# Patient Record
Sex: MALE | Race: WHITE | NOT HISPANIC OR LATINO | Employment: UNEMPLOYED | URBAN - METROPOLITAN AREA
[De-identification: names, ages, dates, MRNs, and addresses within clinical notes are randomized per-mention and may not be internally consistent; named-entity substitution may affect disease eponyms.]

---

## 2022-05-16 ENCOUNTER — HOSPITAL ENCOUNTER (INPATIENT)
Facility: HOSPITAL | Age: 32
LOS: 1 days | Discharge: LEFT BEFORE DISCHARGE | End: 2022-05-18
Attending: EMERGENCY MEDICINE | Admitting: INTERNAL MEDICINE

## 2022-05-16 DIAGNOSIS — F10.939 ALCOHOL WITHDRAWAL SYNDROME WITH COMPLICATION: Primary | ICD-10-CM

## 2022-05-16 LAB
ALBUMIN SERPL-MCNC: 4 G/DL (ref 3.5–5.2)
ALBUMIN/GLOB SERPL: 1.8 G/DL
ALP SERPL-CCNC: 78 U/L (ref 39–117)
ALT SERPL W P-5'-P-CCNC: 29 U/L (ref 1–41)
ANION GAP SERPL CALCULATED.3IONS-SCNC: 13 MMOL/L (ref 5–15)
AST SERPL-CCNC: 21 U/L (ref 1–40)
BASOPHILS # BLD AUTO: 0.08 10*3/MM3 (ref 0–0.2)
BASOPHILS NFR BLD AUTO: 1.6 % (ref 0–1.5)
BILIRUB SERPL-MCNC: 0.2 MG/DL (ref 0–1.2)
BUN SERPL-MCNC: 4 MG/DL (ref 6–20)
BUN/CREAT SERPL: 6.3 (ref 7–25)
CALCIUM SPEC-SCNC: 7.9 MG/DL (ref 8.6–10.5)
CHLORIDE SERPL-SCNC: 106 MMOL/L (ref 98–107)
CO2 SERPL-SCNC: 22 MMOL/L (ref 22–29)
CREAT SERPL-MCNC: 0.63 MG/DL (ref 0.76–1.27)
DEPRECATED RDW RBC AUTO: 47.9 FL (ref 37–54)
EGFRCR SERPLBLD CKD-EPI 2021: 129.6 ML/MIN/1.73
EOSINOPHIL # BLD AUTO: 0.18 10*3/MM3 (ref 0–0.4)
EOSINOPHIL NFR BLD AUTO: 3.7 % (ref 0.3–6.2)
ERYTHROCYTE [DISTWIDTH] IN BLOOD BY AUTOMATED COUNT: 13.8 % (ref 12.3–15.4)
ETHANOL BLD-MCNC: 133 MG/DL (ref 0–10)
ETHANOL UR QL: 0.13 %
GLOBULIN UR ELPH-MCNC: 2.2 GM/DL
GLUCOSE SERPL-MCNC: 91 MG/DL (ref 65–99)
HCT VFR BLD AUTO: 43.2 % (ref 37.5–51)
HGB BLD-MCNC: 14.7 G/DL (ref 13–17.7)
IMM GRANULOCYTES # BLD AUTO: 0.02 10*3/MM3 (ref 0–0.05)
IMM GRANULOCYTES NFR BLD AUTO: 0.4 % (ref 0–0.5)
INR PPP: 1.04 (ref 0.9–1.1)
LYMPHOCYTES # BLD AUTO: 1.96 10*3/MM3 (ref 0.7–3.1)
LYMPHOCYTES NFR BLD AUTO: 40.3 % (ref 19.6–45.3)
MAGNESIUM SERPL-MCNC: 1.9 MG/DL (ref 1.6–2.6)
MCH RBC QN AUTO: 32.3 PG (ref 26.6–33)
MCHC RBC AUTO-ENTMCNC: 34 G/DL (ref 31.5–35.7)
MCV RBC AUTO: 94.9 FL (ref 79–97)
MONOCYTES # BLD AUTO: 0.28 10*3/MM3 (ref 0.1–0.9)
MONOCYTES NFR BLD AUTO: 5.8 % (ref 5–12)
NEUTROPHILS NFR BLD AUTO: 2.34 10*3/MM3 (ref 1.7–7)
NEUTROPHILS NFR BLD AUTO: 48.2 % (ref 42.7–76)
NRBC BLD AUTO-RTO: 0 /100 WBC (ref 0–0.2)
PLATELET # BLD AUTO: 201 10*3/MM3 (ref 140–450)
PMV BLD AUTO: 10.1 FL (ref 6–12)
POTASSIUM SERPL-SCNC: 3.5 MMOL/L (ref 3.5–5.2)
PROT SERPL-MCNC: 6.2 G/DL (ref 6–8.5)
PROTHROMBIN TIME: 13.5 SECONDS (ref 11.7–14.2)
RBC # BLD AUTO: 4.55 10*6/MM3 (ref 4.14–5.8)
SARS-COV-2 RNA RESP QL NAA+PROBE: NOT DETECTED
SODIUM SERPL-SCNC: 141 MMOL/L (ref 136–145)
WBC NRBC COR # BLD: 4.86 10*3/MM3 (ref 3.4–10.8)

## 2022-05-16 PROCEDURE — 85025 COMPLETE CBC W/AUTO DIFF WBC: CPT | Performed by: PHYSICIAN ASSISTANT

## 2022-05-16 PROCEDURE — 99284 EMERGENCY DEPT VISIT MOD MDM: CPT

## 2022-05-16 PROCEDURE — U0003 INFECTIOUS AGENT DETECTION BY NUCLEIC ACID (DNA OR RNA); SEVERE ACUTE RESPIRATORY SYNDROME CORONAVIRUS 2 (SARS-COV-2) (CORONAVIRUS DISEASE [COVID-19]), AMPLIFIED PROBE TECHNIQUE, MAKING USE OF HIGH THROUGHPUT TECHNOLOGIES AS DESCRIBED BY CMS-2020-01-R: HCPCS | Performed by: PHYSICIAN ASSISTANT

## 2022-05-16 PROCEDURE — 80053 COMPREHEN METABOLIC PANEL: CPT | Performed by: PHYSICIAN ASSISTANT

## 2022-05-16 PROCEDURE — 25010000002 ONDANSETRON PER 1 MG: Performed by: PHYSICIAN ASSISTANT

## 2022-05-16 PROCEDURE — 82077 ASSAY SPEC XCP UR&BREATH IA: CPT | Performed by: PHYSICIAN ASSISTANT

## 2022-05-16 PROCEDURE — 85610 PROTHROMBIN TIME: CPT | Performed by: PHYSICIAN ASSISTANT

## 2022-05-16 PROCEDURE — 25010000002 THIAMINE PER 100 MG: Performed by: PHYSICIAN ASSISTANT

## 2022-05-16 PROCEDURE — 36415 COLL VENOUS BLD VENIPUNCTURE: CPT

## 2022-05-16 PROCEDURE — 83735 ASSAY OF MAGNESIUM: CPT | Performed by: PHYSICIAN ASSISTANT

## 2022-05-16 PROCEDURE — 25010000002 LORAZEPAM PER 2 MG: Performed by: EMERGENCY MEDICINE

## 2022-05-16 RX ORDER — ONDANSETRON 2 MG/ML
4 INJECTION INTRAMUSCULAR; INTRAVENOUS ONCE
Status: DISCONTINUED | OUTPATIENT
Start: 2022-05-16 | End: 2022-05-16

## 2022-05-16 RX ORDER — LORAZEPAM 2 MG/ML
2 INJECTION INTRAMUSCULAR ONCE
Status: COMPLETED | OUTPATIENT
Start: 2022-05-16 | End: 2022-05-16

## 2022-05-16 RX ORDER — ONDANSETRON 2 MG/ML
4 INJECTION INTRAMUSCULAR; INTRAVENOUS ONCE
Status: COMPLETED | OUTPATIENT
Start: 2022-05-16 | End: 2022-05-16

## 2022-05-16 RX ORDER — ALUMINA, MAGNESIA, AND SIMETHICONE 2400; 2400; 240 MG/30ML; MG/30ML; MG/30ML
15 SUSPENSION ORAL ONCE
Status: COMPLETED | OUTPATIENT
Start: 2022-05-16 | End: 2022-05-16

## 2022-05-16 RX ORDER — SODIUM CHLORIDE 0.9 % (FLUSH) 0.9 %
10 SYRINGE (ML) INJECTION AS NEEDED
Status: DISCONTINUED | OUTPATIENT
Start: 2022-05-16 | End: 2022-05-18 | Stop reason: HOSPADM

## 2022-05-16 RX ORDER — LIDOCAINE HYDROCHLORIDE 20 MG/ML
15 SOLUTION OROPHARYNGEAL ONCE
Status: COMPLETED | OUTPATIENT
Start: 2022-05-16 | End: 2022-05-16

## 2022-05-16 RX ADMIN — ONDANSETRON 4 MG: 2 INJECTION INTRAMUSCULAR; INTRAVENOUS at 22:48

## 2022-05-16 RX ADMIN — THIAMINE HYDROCHLORIDE 1000 ML/HR: 100 INJECTION, SOLUTION INTRAMUSCULAR; INTRAVENOUS at 22:38

## 2022-05-16 RX ADMIN — LORAZEPAM 2 MG: 2 INJECTION INTRAMUSCULAR; INTRAVENOUS at 22:44

## 2022-05-16 RX ADMIN — LIDOCAINE HYDROCHLORIDE 15 ML: 20 SOLUTION ORAL; TOPICAL at 22:46

## 2022-05-16 RX ADMIN — ALUMINUM HYDROXIDE, MAGNESIUM HYDROXIDE, AND DIMETHICONE 15 ML: 400; 400; 40 SUSPENSION ORAL at 22:47

## 2022-05-17 ENCOUNTER — APPOINTMENT (OUTPATIENT)
Dept: GENERAL RADIOLOGY | Facility: HOSPITAL | Age: 32
End: 2022-05-17

## 2022-05-17 PROBLEM — R56.9 SEIZURE DUE TO ALCOHOL WITHDRAWAL: Status: ACTIVE | Noted: 2022-05-17

## 2022-05-17 PROBLEM — F10.939 SEIZURE DUE TO ALCOHOL WITHDRAWAL: Status: ACTIVE | Noted: 2022-05-17

## 2022-05-17 PROBLEM — F17.210 TOBACCO DEPENDENCE DUE TO CIGARETTES: Status: ACTIVE | Noted: 2022-05-17

## 2022-05-17 PROBLEM — F10.939 ALCOHOL WITHDRAWAL SYNDROME WITH COMPLICATION: Status: ACTIVE | Noted: 2022-05-17

## 2022-05-17 PROBLEM — E83.51 HYPOCALCEMIA: Status: ACTIVE | Noted: 2022-05-17

## 2022-05-17 LAB
25(OH)D3 SERPL-MCNC: 19.7 NG/ML (ref 30–100)
AMPHET+METHAMPHET UR QL: NEGATIVE
ANION GAP SERPL CALCULATED.3IONS-SCNC: 11.1 MMOL/L (ref 5–15)
BARBITURATES UR QL SCN: POSITIVE
BENZODIAZ UR QL SCN: POSITIVE
BILIRUB UR QL STRIP: NEGATIVE
BUN SERPL-MCNC: 4 MG/DL (ref 6–20)
BUN/CREAT SERPL: 6.5 (ref 7–25)
CALCIUM SPEC-SCNC: 7.8 MG/DL (ref 8.6–10.5)
CANNABINOIDS SERPL QL: NEGATIVE
CHLORIDE SERPL-SCNC: 103 MMOL/L (ref 98–107)
CLARITY UR: CLEAR
CO2 SERPL-SCNC: 21.9 MMOL/L (ref 22–29)
COCAINE UR QL: NEGATIVE
COLOR UR: YELLOW
CREAT SERPL-MCNC: 0.62 MG/DL (ref 0.76–1.27)
DEPRECATED RDW RBC AUTO: 45.3 FL (ref 37–54)
EGFRCR SERPLBLD CKD-EPI 2021: 130.2 ML/MIN/1.73
ERYTHROCYTE [DISTWIDTH] IN BLOOD BY AUTOMATED COUNT: 13.5 % (ref 12.3–15.4)
GLUCOSE SERPL-MCNC: 92 MG/DL (ref 65–99)
GLUCOSE UR STRIP-MCNC: NEGATIVE MG/DL
HCT VFR BLD AUTO: 39.2 % (ref 37.5–51)
HGB BLD-MCNC: 13.1 G/DL (ref 13–17.7)
HGB UR QL STRIP.AUTO: NEGATIVE
KETONES UR QL STRIP: NEGATIVE
LEUKOCYTE ESTERASE UR QL STRIP.AUTO: NEGATIVE
LIPASE SERPL-CCNC: 7 U/L (ref 13–60)
MAGNESIUM SERPL-MCNC: 1.7 MG/DL (ref 1.6–2.6)
MCH RBC QN AUTO: 31.3 PG (ref 26.6–33)
MCHC RBC AUTO-ENTMCNC: 33.4 G/DL (ref 31.5–35.7)
MCV RBC AUTO: 93.6 FL (ref 79–97)
METHADONE UR QL SCN: NEGATIVE
NITRITE UR QL STRIP: NEGATIVE
OPIATES UR QL: NEGATIVE
OXYCODONE UR QL SCN: NEGATIVE
PH UR STRIP.AUTO: 5.5 [PH] (ref 5–8)
PLATELET # BLD AUTO: 167 10*3/MM3 (ref 140–450)
PMV BLD AUTO: 10.4 FL (ref 6–12)
POTASSIUM SERPL-SCNC: 3.5 MMOL/L (ref 3.5–5.2)
PROT UR QL STRIP: NEGATIVE
RBC # BLD AUTO: 4.19 10*6/MM3 (ref 4.14–5.8)
SODIUM SERPL-SCNC: 136 MMOL/L (ref 136–145)
SP GR UR STRIP: 1.01 (ref 1–1.03)
UROBILINOGEN UR QL STRIP: NORMAL
WBC NRBC COR # BLD: 6.93 10*3/MM3 (ref 3.4–10.8)

## 2022-05-17 PROCEDURE — 25010000002 ONDANSETRON PER 1 MG: Performed by: NURSE PRACTITIONER

## 2022-05-17 PROCEDURE — 81003 URINALYSIS AUTO W/O SCOPE: CPT | Performed by: PHYSICIAN ASSISTANT

## 2022-05-17 PROCEDURE — 80307 DRUG TEST PRSMV CHEM ANLYZR: CPT | Performed by: PHYSICIAN ASSISTANT

## 2022-05-17 PROCEDURE — 83690 ASSAY OF LIPASE: CPT | Performed by: PHYSICIAN ASSISTANT

## 2022-05-17 PROCEDURE — 63710000001 ONDANSETRON PER 8 MG: Performed by: NURSE PRACTITIONER

## 2022-05-17 PROCEDURE — 25010000002 LORAZEPAM PER 2 MG: Performed by: EMERGENCY MEDICINE

## 2022-05-17 PROCEDURE — 71045 X-RAY EXAM CHEST 1 VIEW: CPT

## 2022-05-17 PROCEDURE — 83735 ASSAY OF MAGNESIUM: CPT | Performed by: NURSE PRACTITIONER

## 2022-05-17 PROCEDURE — 85027 COMPLETE CBC AUTOMATED: CPT | Performed by: NURSE PRACTITIONER

## 2022-05-17 PROCEDURE — 25010000002 LORAZEPAM PER 2 MG: Performed by: NURSE PRACTITIONER

## 2022-05-17 PROCEDURE — 25010000002 ENOXAPARIN PER 10 MG: Performed by: NURSE PRACTITIONER

## 2022-05-17 PROCEDURE — 80048 BASIC METABOLIC PNL TOTAL CA: CPT | Performed by: NURSE PRACTITIONER

## 2022-05-17 PROCEDURE — 82306 VITAMIN D 25 HYDROXY: CPT | Performed by: NURSE PRACTITIONER

## 2022-05-17 RX ORDER — SODIUM CHLORIDE 0.9 % (FLUSH) 0.9 %
10 SYRINGE (ML) INJECTION EVERY 12 HOURS SCHEDULED
Status: DISCONTINUED | OUTPATIENT
Start: 2022-05-17 | End: 2022-05-18 | Stop reason: HOSPADM

## 2022-05-17 RX ORDER — LORAZEPAM 2 MG/ML
2 INJECTION INTRAMUSCULAR
Status: DISCONTINUED | OUTPATIENT
Start: 2022-05-17 | End: 2022-05-18 | Stop reason: HOSPADM

## 2022-05-17 RX ORDER — FAMOTIDINE 10 MG/ML
20 INJECTION, SOLUTION INTRAVENOUS EVERY 12 HOURS SCHEDULED
Status: DISCONTINUED | OUTPATIENT
Start: 2022-05-17 | End: 2022-05-18

## 2022-05-17 RX ORDER — NITROGLYCERIN 0.4 MG/1
0.4 TABLET SUBLINGUAL
Status: DISCONTINUED | OUTPATIENT
Start: 2022-05-17 | End: 2022-05-18 | Stop reason: HOSPADM

## 2022-05-17 RX ORDER — LORAZEPAM 2 MG/ML
1 INJECTION INTRAMUSCULAR ONCE
Status: DISCONTINUED | OUTPATIENT
Start: 2022-05-17 | End: 2022-05-17

## 2022-05-17 RX ORDER — LORAZEPAM 2 MG/ML
2 INJECTION INTRAMUSCULAR ONCE
Status: COMPLETED | OUTPATIENT
Start: 2022-05-17 | End: 2022-05-17

## 2022-05-17 RX ORDER — SODIUM CHLORIDE 0.9 % (FLUSH) 0.9 %
10 SYRINGE (ML) INJECTION AS NEEDED
Status: DISCONTINUED | OUTPATIENT
Start: 2022-05-17 | End: 2022-05-18 | Stop reason: HOSPADM

## 2022-05-17 RX ORDER — LORAZEPAM 2 MG/ML
1 INJECTION INTRAMUSCULAR
Status: DISCONTINUED | OUTPATIENT
Start: 2022-05-17 | End: 2022-05-18 | Stop reason: HOSPADM

## 2022-05-17 RX ORDER — ENOXAPARIN SODIUM 100 MG/ML
40 INJECTION SUBCUTANEOUS NIGHTLY
Status: DISCONTINUED | OUTPATIENT
Start: 2022-05-17 | End: 2022-05-18 | Stop reason: HOSPADM

## 2022-05-17 RX ORDER — SERTRALINE HYDROCHLORIDE 100 MG/1
200 TABLET, FILM COATED ORAL DAILY
COMMUNITY

## 2022-05-17 RX ORDER — ONDANSETRON 2 MG/ML
4 INJECTION INTRAMUSCULAR; INTRAVENOUS EVERY 6 HOURS PRN
Status: DISCONTINUED | OUTPATIENT
Start: 2022-05-17 | End: 2022-05-18 | Stop reason: HOSPADM

## 2022-05-17 RX ORDER — DIPHENOXYLATE HYDROCHLORIDE AND ATROPINE SULFATE 2.5; .025 MG/1; MG/1
1 TABLET ORAL DAILY
Status: DISCONTINUED | OUTPATIENT
Start: 2022-05-18 | End: 2022-05-18 | Stop reason: HOSPADM

## 2022-05-17 RX ORDER — ACETAMINOPHEN 160 MG/5ML
650 SOLUTION ORAL EVERY 4 HOURS PRN
Status: DISCONTINUED | OUTPATIENT
Start: 2022-05-17 | End: 2022-05-18 | Stop reason: HOSPADM

## 2022-05-17 RX ORDER — ACETAMINOPHEN 650 MG/1
650 SUPPOSITORY RECTAL EVERY 4 HOURS PRN
Status: DISCONTINUED | OUTPATIENT
Start: 2022-05-17 | End: 2022-05-18 | Stop reason: HOSPADM

## 2022-05-17 RX ORDER — CALCIUM CARBONATE 200(500)MG
2 TABLET,CHEWABLE ORAL 2 TIMES DAILY PRN
Status: DISCONTINUED | OUTPATIENT
Start: 2022-05-17 | End: 2022-05-18 | Stop reason: HOSPADM

## 2022-05-17 RX ORDER — ACETAMINOPHEN 325 MG/1
650 TABLET ORAL EVERY 4 HOURS PRN
Status: DISCONTINUED | OUTPATIENT
Start: 2022-05-17 | End: 2022-05-18 | Stop reason: HOSPADM

## 2022-05-17 RX ORDER — LORAZEPAM 1 MG/1
1 TABLET ORAL
Status: DISCONTINUED | OUTPATIENT
Start: 2022-05-17 | End: 2022-05-18 | Stop reason: HOSPADM

## 2022-05-17 RX ORDER — ONDANSETRON 4 MG/1
4 TABLET, FILM COATED ORAL EVERY 6 HOURS PRN
Status: DISCONTINUED | OUTPATIENT
Start: 2022-05-17 | End: 2022-05-18 | Stop reason: HOSPADM

## 2022-05-17 RX ORDER — OLANZAPINE 10 MG/1
20 TABLET ORAL NIGHTLY
COMMUNITY

## 2022-05-17 RX ORDER — LORAZEPAM 1 MG/1
2 TABLET ORAL
Status: DISCONTINUED | OUTPATIENT
Start: 2022-05-17 | End: 2022-05-18 | Stop reason: HOSPADM

## 2022-05-17 RX ORDER — CHLORDIAZEPOXIDE HYDROCHLORIDE 25 MG/1
25 CAPSULE, GELATIN COATED ORAL EVERY 8 HOURS SCHEDULED
Status: DISCONTINUED | OUTPATIENT
Start: 2022-05-17 | End: 2022-05-18 | Stop reason: HOSPADM

## 2022-05-17 RX ORDER — SODIUM CHLORIDE 9 MG/ML
75 INJECTION, SOLUTION INTRAVENOUS CONTINUOUS
Status: DISCONTINUED | OUTPATIENT
Start: 2022-05-17 | End: 2022-05-18 | Stop reason: HOSPADM

## 2022-05-17 RX ORDER — LORAZEPAM 1 MG/1
1 TABLET ORAL EVERY 6 HOURS
COMMUNITY

## 2022-05-17 RX ORDER — FOLIC ACID 1 MG/1
1 TABLET ORAL DAILY
Status: DISCONTINUED | OUTPATIENT
Start: 2022-05-18 | End: 2022-05-18 | Stop reason: HOSPADM

## 2022-05-17 RX ADMIN — ENOXAPARIN SODIUM 40 MG: 100 INJECTION SUBCUTANEOUS at 21:05

## 2022-05-17 RX ADMIN — LORAZEPAM 2 MG: 2 INJECTION INTRAMUSCULAR; INTRAVENOUS at 17:14

## 2022-05-17 RX ADMIN — SODIUM CHLORIDE 100 ML/HR: 9 INJECTION, SOLUTION INTRAVENOUS at 04:16

## 2022-05-17 RX ADMIN — LORAZEPAM 2 MG: 2 INJECTION INTRAMUSCULAR; INTRAVENOUS at 11:22

## 2022-05-17 RX ADMIN — Medication 10 ML: at 08:00

## 2022-05-17 RX ADMIN — LORAZEPAM 1 MG: 2 INJECTION INTRAMUSCULAR; INTRAVENOUS at 08:15

## 2022-05-17 RX ADMIN — ONDANSETRON HYDROCHLORIDE 4 MG: 4 TABLET, FILM COATED ORAL at 21:05

## 2022-05-17 RX ADMIN — CHLORDIAZEPOXIDE HYDROCHLORIDE 25 MG: 25 CAPSULE ORAL at 14:40

## 2022-05-17 RX ADMIN — LORAZEPAM 2 MG: 2 INJECTION INTRAMUSCULAR; INTRAVENOUS at 11:05

## 2022-05-17 RX ADMIN — LORAZEPAM 2 MG: 2 INJECTION INTRAMUSCULAR; INTRAVENOUS at 11:41

## 2022-05-17 RX ADMIN — LORAZEPAM 2 MG: 2 INJECTION INTRAMUSCULAR; INTRAVENOUS at 15:40

## 2022-05-17 RX ADMIN — CHLORDIAZEPOXIDE HYDROCHLORIDE 25 MG: 25 CAPSULE ORAL at 21:05

## 2022-05-17 RX ADMIN — LORAZEPAM 2 MG: 2 INJECTION INTRAMUSCULAR; INTRAVENOUS at 06:27

## 2022-05-17 RX ADMIN — LORAZEPAM 2 MG: 2 INJECTION INTRAMUSCULAR; INTRAVENOUS at 12:25

## 2022-05-17 RX ADMIN — LORAZEPAM 2 MG: 2 INJECTION INTRAMUSCULAR; INTRAVENOUS at 01:34

## 2022-05-17 RX ADMIN — FAMOTIDINE 20 MG: 10 INJECTION INTRAVENOUS at 22:00

## 2022-05-17 RX ADMIN — CHLORDIAZEPOXIDE HYDROCHLORIDE 25 MG: 25 CAPSULE ORAL at 05:59

## 2022-05-17 RX ADMIN — LORAZEPAM 2 MG: 2 INJECTION INTRAMUSCULAR; INTRAVENOUS at 06:00

## 2022-05-17 RX ADMIN — LORAZEPAM 2 MG: 2 INJECTION INTRAMUSCULAR; INTRAVENOUS at 14:39

## 2022-05-17 RX ADMIN — LORAZEPAM 2 MG: 2 INJECTION INTRAMUSCULAR; INTRAVENOUS at 10:41

## 2022-05-17 RX ADMIN — SODIUM CHLORIDE 1000 ML: 9 INJECTION, SOLUTION INTRAVENOUS at 02:30

## 2022-05-17 RX ADMIN — LORAZEPAM 2 MG: 1 TABLET ORAL at 21:05

## 2022-05-17 RX ADMIN — Medication 10 ML: at 21:07

## 2022-05-17 RX ADMIN — LORAZEPAM 2 MG: 2 INJECTION INTRAMUSCULAR; INTRAVENOUS at 13:41

## 2022-05-17 RX ADMIN — LORAZEPAM 2 MG: 2 INJECTION INTRAMUSCULAR; INTRAVENOUS at 12:00

## 2022-05-17 RX ADMIN — LORAZEPAM 2 MG: 1 TABLET ORAL at 23:08

## 2022-05-17 RX ADMIN — ONDANSETRON 4 MG: 2 INJECTION INTRAMUSCULAR; INTRAVENOUS at 15:06

## 2022-05-17 RX ADMIN — FAMOTIDINE 20 MG: 10 INJECTION INTRAVENOUS at 09:24

## 2022-05-17 RX ADMIN — LORAZEPAM 2 MG: 2 INJECTION INTRAMUSCULAR; INTRAVENOUS at 07:01

## 2022-05-17 RX ADMIN — LORAZEPAM 2 MG: 2 INJECTION INTRAMUSCULAR; INTRAVENOUS at 10:22

## 2022-05-17 RX ADMIN — LORAZEPAM 2 MG: 1 TABLET ORAL at 17:45

## 2022-05-17 NOTE — PLAN OF CARE
Goal Outcome Evaluation:  Plan of Care Reviewed With: patient           Outcome Evaluation: Giving Ativan and other meds as ordered.  Pt is eating a turkey sandwich and seems really calm in room.  Admission done.  Seizure Precautions maintained.  Urinal within reach and uses.  VSS.  ST.  Will cont to monitor.

## 2022-05-17 NOTE — ED TRIAGE NOTES
Pt to ED from [facility] with c/o withdrawal.  Per EMS pt was admitted in [place] r/t seizure.  Pt takes ativan QID for his seizure disorder and reports last dose was x 3 days ago.  Pt also admits to daily ETOH, approx 25-30 shots daily.  Pt has tremors present, A&ox4.    Pt wearing mask, staff wearing appropriate PPE.

## 2022-05-17 NOTE — ED PROVIDER NOTES
EMERGENCY DEPARTMENT ENCOUNTER    Room Number:  01/01  Date of encounter:  5/17/2022  PCP: Provider, No Known  Historian: Patient      HPI:  Chief Complaint: Alcohol withdrawal  A complete HPI/ROS/PMH/PSH/SH/FH are unobtainable due to: Nothing    Context: Davonte Mg is a 32 y.o. male who presents to the ED c/o alcohol withdrawal.  Patient states he has been drinking heavily over the past week or more.  He attempted to sober up and when he woke up this morning he was feeling very anxious, nauseated, vomiting, sweating, and experiencing epigastric pain.  He took 4 shots of hard liquor in an effort to control symptoms.  This temporarily helped until mid afternoon.  His mother evaluated him and found him to be experiencing withdrawal symptoms and recommended he present to the emergency department for further treatment.    Patient was recently seen and admitted for alcohol withdrawal at [facility] in [facility].  He was then discharged and to be admitted to [facility] in [facility] for alcohol abuse.  Between discharge from [facility] and showing up at [facility], he managed to drink againtriggering withdrawal symptoms.  Patient does have a past medical history of seizure disorder and is on Ativan.  He reports he drinks 20-25 shots a day and has a past medical history of alcohol withdrawal.      PAST MEDICAL HISTORY  Active Ambulatory Problems     Diagnosis Date Noted   • No Active Ambulatory Problems     Resolved Ambulatory Problems     Diagnosis Date Noted   • No Resolved Ambulatory Problems     No Additional Past Medical History         PAST SURGICAL HISTORY  Past Surgical History:   Procedure Laterality Date   • KNEE SURGERY Right          FAMILY HISTORY  History reviewed. No pertinent family history.      SOCIAL HISTORY  Social History     Socioeconomic History   • Marital status: Single   Tobacco Use   • Smoking status: Current Every Day Smoker      Packs/day: 0.50     Types: Cigarettes   • Smokeless tobacco: Never Used   Substance and Sexual Activity   • Alcohol use: Yes     Alcohol/week: 210.0 standard drinks     Types: 210 Shots of liquor per week         ALLERGIES  Patient has no known allergies.        REVIEW OF SYSTEMS  Review of Systems   Constitutional: Positive for diaphoresis. Negative for chills and fever.   HENT: Negative.    Eyes: Negative.    Cardiovascular: Positive for palpitations.   Gastrointestinal: Positive for nausea and vomiting.   Genitourinary: Negative.    Musculoskeletal: Negative.    Skin: Negative.    Neurological: Negative.    Psychiatric/Behavioral: Negative.         All systems reviewed and negative except for those discussed in HPI.       PHYSICAL EXAM    I have reviewed the triage vital signs and nursing notes.    ED Triage Vitals   Temp Heart Rate Resp BP SpO2   05/16/22 2146 05/16/22 2146 05/16/22 2146 05/16/22 2202 05/16/22 2146   98.4 °F (36.9 °C) 65 28 118/66 97 %      Temp src Heart Rate Source Patient Position BP Location FiO2 (%)   05/16/22 2146 -- -- -- --   Temporal           Physical Exam  GENERAL: Anxious, diaphoretic, and actively vomiting  HENT: nares patent  EYES: no scleral icterus  CV: regular rhythm, slightly tachycardic   RESPIRATORY: normal effort  ABDOMEN: soft  MUSCULOSKELETAL: no deformity  NEURO: alert to person and date disoriented to place and time, moves all extremities, follows commands, tremorous  SKIN: warm, dry        LAB RESULTS  Recent Results (from the past 24 hour(s))   Comprehensive Metabolic Panel    Collection Time: 05/16/22 10:09 PM    Specimen: Blood   Result Value Ref Range    Glucose 91 65 - 99 mg/dL    BUN 4 (L) 6 - 20 mg/dL    Creatinine 0.63 (L) 0.76 - 1.27 mg/dL    Sodium 141 136 - 145 mmol/L    Potassium 3.5 3.5 - 5.2 mmol/L    Chloride 106 98 - 107 mmol/L    CO2 22.0 22.0 - 29.0 mmol/L    Calcium 7.9 (L) 8.6 - 10.5 mg/dL    Total Protein 6.2 6.0 - 8.5 g/dL    Albumin 4.00 3.50 -  5.20 g/dL    ALT (SGPT) 29 1 - 41 U/L    AST (SGOT) 21 1 - 40 U/L    Alkaline Phosphatase 78 39 - 117 U/L    Total Bilirubin 0.2 0.0 - 1.2 mg/dL    Globulin 2.2 gm/dL    A/G Ratio 1.8 g/dL    BUN/Creatinine Ratio 6.3 (L) 7.0 - 25.0    Anion Gap 13.0 5.0 - 15.0 mmol/L    eGFR 129.6 >60.0 mL/min/1.73   Protime-INR    Collection Time: 05/16/22 10:09 PM    Specimen: Blood   Result Value Ref Range    Protime 13.5 11.7 - 14.2 Seconds    INR 1.04 0.90 - 1.10   Ethanol    Collection Time: 05/16/22 10:09 PM    Specimen: Blood   Result Value Ref Range    Ethanol 133 (H) 0 - 10 mg/dL    Ethanol % 0.133 %   Magnesium    Collection Time: 05/16/22 10:09 PM    Specimen: Blood   Result Value Ref Range    Magnesium 1.9 1.6 - 2.6 mg/dL   CBC Auto Differential    Collection Time: 05/16/22 10:09 PM    Specimen: Blood   Result Value Ref Range    WBC 4.86 3.40 - 10.80 10*3/mm3    RBC 4.55 4.14 - 5.80 10*6/mm3    Hemoglobin 14.7 13.0 - 17.7 g/dL    Hematocrit 43.2 37.5 - 51.0 %    MCV 94.9 79.0 - 97.0 fL    MCH 32.3 26.6 - 33.0 pg    MCHC 34.0 31.5 - 35.7 g/dL    RDW 13.8 12.3 - 15.4 %    RDW-SD 47.9 37.0 - 54.0 fl    MPV 10.1 6.0 - 12.0 fL    Platelets 201 140 - 450 10*3/mm3    Neutrophil % 48.2 42.7 - 76.0 %    Lymphocyte % 40.3 19.6 - 45.3 %    Monocyte % 5.8 5.0 - 12.0 %    Eosinophil % 3.7 0.3 - 6.2 %    Basophil % 1.6 (H) 0.0 - 1.5 %    Immature Grans % 0.4 0.0 - 0.5 %    Neutrophils, Absolute 2.34 1.70 - 7.00 10*3/mm3    Lymphocytes, Absolute 1.96 0.70 - 3.10 10*3/mm3    Monocytes, Absolute 0.28 0.10 - 0.90 10*3/mm3    Eosinophils, Absolute 0.18 0.00 - 0.40 10*3/mm3    Basophils, Absolute 0.08 0.00 - 0.20 10*3/mm3    Immature Grans, Absolute 0.02 0.00 - 0.05 10*3/mm3    nRBC 0.0 0.0 - 0.2 /100 WBC   COVID-19, KEVIN IN-HOUSE CEPHEID/MEMO NP SWAB IN TRANSPORT MEDIA 8-12 HR TAT - Swab, Nasopharynx    Collection Time: 05/16/22 10:11 PM    Specimen: Nasopharynx; Swab   Result Value Ref Range    COVID19 Not Detected Not Detected - Ref.  Range   Lipase    Collection Time: 05/17/22  1:34 AM    Specimen: Blood   Result Value Ref Range    Lipase 7 (L) 13 - 60 U/L   Urinalysis With Microscopic If Indicated (No Culture) - Urine, Clean Catch    Collection Time: 05/17/22  2:29 AM    Specimen: Urine, Clean Catch   Result Value Ref Range    Color, UA Yellow Yellow, Straw    Appearance, UA Clear Clear    pH, UA 5.5 5.0 - 8.0    Specific Gravity, UA 1.015 1.005 - 1.030    Glucose, UA Negative Negative    Ketones, UA Negative Negative    Bilirubin, UA Negative Negative    Blood, UA Negative Negative    Protein, UA Negative Negative    Leuk Esterase, UA Negative Negative    Nitrite, UA Negative Negative    Urobilinogen, UA 0.2 E.U./dL 0.2 - 1.0 E.U./dL       Ordered the above labs and independently reviewed the results.        RADIOLOGY  No Radiology Exams Resulted Within Past 24 Hours    I ordered the above noted radiological studies. Reviewed by me and discussed with radiologist.  See dictation for official radiology interpretation.      PROCEDURES    Critical Care  Performed by: Jason Salazar III, PA  Authorized by: Aileen Cifuentes MD     Critical care provider statement:     Critical care time (minutes):  30    Critical care was necessary to treat or prevent imminent or life-threatening deterioration of the following conditions: Alcohol withdrawal.    Critical care was time spent personally by me on the following activities:  Blood draw for specimens, development of treatment plan with patient or surrogate, discussions with consultants, evaluation of patient's response to treatment, examination of patient, obtaining history from patient or surrogate, ordering and performing treatments and interventions, ordering and review of laboratory studies, pulse oximetry and re-evaluation of patient's condition    I assumed direction of critical care for this patient from another provider in my specialty: yes            MEDICATIONS GIVEN IN ER    Medications    sodium chloride 0.9 % flush 10 mL (has no administration in time range)   sodium chloride 0.9 % bolus 1,000 mL (1,000 mL Intravenous New Bag 5/17/22 0230)   thiamine (B-1) 100 mg, folic acid 1 mg in sodium chloride 0.9 % 1,000 mL infusion (0 mL/hr Intravenous Stopped 5/17/22 0111)   LORazepam (ATIVAN) injection 2 mg (2 mg Intravenous Given 5/16/22 2244)   aluminum-magnesium hydroxide-simethicone (MAALOX MAX) 400-400-40 MG/5ML suspension 15 mL (15 mL Oral Given 5/16/22 2247)   Lidocaine Viscous HCl (XYLOCAINE) 2 % solution 15 mL (15 mL Mouth/Throat Given 5/16/22 2246)   ondansetron (ZOFRAN) injection 4 mg (4 mg Intravenous Given 5/16/22 2248)   LORazepam (ATIVAN) injection 2 mg (2 mg Intravenous Given 5/17/22 0134)         PROGRESS, DATA ANALYSIS, CONSULTS, AND MEDICAL DECISION MAKING    All labs have been independently reviewed by me.  All radiology studies have been reviewed by me and discussed with radiologist dictating the report.   EKG's independently viewed and interpreted by me.  Discussion below represents my analysis of pertinent findings related to patient's condition, differential diagnosis, treatment plan and final disposition.    DDx includes but not limited to: Polysubstance abuse, alcohol withdrawal    ED Course as of 05/17/22 0250   Tue May 17, 2022   0118 WBC: 4.86 [RC]   0118 Hemoglobin: 14.7 [RC]   0118 Hematocrit: 43.2 [RC]   0118 Platelets: 201 [RC]   0118 Glucose: 91 [RC]   0118 BUN(!): 4 [RC]   0118 Creatinine(!): 0.63 [RC]   0118 Sodium: 141 [RC]   0118 Potassium: 3.5 [RC]   0118 CO2: 22.0 [RC]   0118 Anion Gap: 13.0 [RC]   0119 ALT (SGPT): 29 [RC]   0119 AST (SGOT): 21 [RC]   0119 Alkaline Phosphatase: 78 [RC]   0119 Ethanol(!): 133 [RC]   0119 Magnesium: 1.9 [RC]   0119 COVID19: Not Detected [RC]   0119 INR: 1.04 [RC]   0239 Working diagnosis is alcohol withdrawal.  Discussed the patient's case with SHAYY Best with [facility].  To admit to a telemetry bed under Dr. Montiel's care. [RC]       ED Course User Index  [RC] Jason Salazar III, PA           PPE: The patient wore a surgical mask throughout the entire patient encounter. I wore an N95.    AS OF 02:50 EDT VITALS:    BP - 135/93  HR - 77  TEMP - 98.4 °F (36.9 °C) (Temporal)  O2 SATS - 97%        DIAGNOSIS  Final diagnoses:   Alcohol withdrawal syndrome with complication (HCC)         DISPOSITION  ADMISSION    Discussed treatment plan and reason for admission with pt/family and admitting physician.  Pt/family voiced understanding of the plan for admission for further testing/treatment as needed.              Jason Salazar III, PA  05/17/22 0250

## 2022-05-17 NOTE — CASE MANAGEMENT/SOCIAL WORK
Continued Stay Note  [facility]     Patient Name: Davonte Mg  MRN: [id number]  Today's Date: 5/17/2022    Admit Date: 5/16/2022     Discharge Plan     Row Name 05/17/22 1444       Plan    Plan Plan return to the [facility] for Recovery.  STACEY Doe RN    Patient Needs State Guardianship? Yes    Plan Comments FACE SHEET VERIFIED.  Spoke with pt at bedside.  Pt recently moved from [place] to [place] to live with his mother and grandmother.  Pt lives in a single story house with his mother  ( Iris Mg [phone]) and grandmother  (Shannan Franz).  Pt is independent with ADLs.   Pt gets his prescriptions at [facility] but does not have a local pharmacy.  Pt was admitted from [facility] for [facility].  Called and spoke with Lazaro  ( [phone]) at [facility] for [facility].  He states pt can return when ready for discharge.  Plan return to the [facility] for Recovery.  STACEY Doe RN               Discharge Codes    No documentation.                     Radha Doe RN

## 2022-05-17 NOTE — H&P
Patient Name:  Davonte Mg  YOB: 1990  MRN:  [id number]  Admit Date:  5/16/2022  Patient Care Team:  Provider, No Known as PCP - General      Subjective   History Present Illness     Chief Complaint   Patient presents with   • Withdrawal       Mr. Mg is a 32 y.o. male smoker with a history of alcohol abuse, previous seizure that presents to [facility] complaining of withdrawal. He has been drinking heavily up to 20-25 shots/day. He was recently admitted to [facility] in [place], discharged 5/14, w/plan to be admitted to [facility] for alcohol abuse. He has attempted to decrease his drinking but has had withdrawal symptoms of anxiety, n/v, diaphoresis, epigastric pain, hallucinations. He has had seizures due to alcohol withdrawal. Denies recent fever, chest pain. He has mild shortness of breath and productive cough.    Afebrile. HR controlled. BP stable. WBC 4.86, Hgb 14.7. Mg 1.9. Ethanol 133. BUN/Cr 4/0.63, Ca 7.9; remaining chem panel wnl. Covid neg. Lipase 7. UA neg. UDS barbituates, benzo. No imaging in ED    Review of Systems   Constitutional: Positive for diaphoresis. Negative for fever.   HENT: Negative for congestion.    Respiratory: Positive for cough and shortness of breath.    Cardiovascular: Negative for chest pain.   Gastrointestinal: Positive for abdominal pain, nausea and vomiting.   Genitourinary: Negative for difficulty urinating.   Musculoskeletal: Negative for arthralgias and myalgias.   Skin: Negative for rash.   Neurological: Positive for tremors.   Psychiatric/Behavioral: Positive for hallucinations. The patient is nervous/anxious.         Personal History     History reviewed. No pertinent past medical history.  Past Surgical History:   Procedure Laterality Date   • KNEE SURGERY Right      History reviewed. No pertinent family history.  Social History     Tobacco Use   • Smoking status: Current Every Day Smoker     Packs/day: 0.50      Types: Cigarettes   • Smokeless tobacco: Never Used   Substance Use Topics   • Alcohol use: Yes     Alcohol/week: 210.0 standard drinks     Types: 210 Shots of liquor per week     No current facility-administered medications on file prior to encounter.     No current outpatient medications on file prior to encounter.     No Known Allergies    Objective    Objective     Vital Signs  Temp:  [98.4 °F (36.9 °C)] 98.4 °F (36.9 °C)  Heart Rate:  [63-92] 66  Resp:  [28] 28  BP: (115-143)/(66-94) 121/71  SpO2:  [92 %-98 %] 96 %  on  Flow (L/min):  [2] 2;   Device (Oxygen Therapy): nasal cannula  Body mass index is 26.29 kg/m².    Physical Exam  Vitals and nursing note reviewed.   Constitutional:       General: He is not in acute distress.     Appearance: He is ill-appearing.   HENT:      Head: Normocephalic.      Mouth/Throat:      Mouth: Mucous membranes are moist.   Eyes:      Conjunctiva/sclera: Conjunctivae normal.   Cardiovascular:      Rate and Rhythm: Normal rate and regular rhythm.   Pulmonary:      Effort: Pulmonary effort is normal. No respiratory distress.      Breath sounds: Examination of the left-lower field reveals decreased breath sounds. Decreased breath sounds present.   Abdominal:      General: Bowel sounds are normal.      Palpations: Abdomen is soft.   Musculoskeletal:      Cervical back: Neck supple.      Right lower leg: No edema.      Left lower leg: No edema.   Skin:     General: Skin is warm and dry.   Neurological:      Mental Status: He is alert and oriented to person, place, and time.      Motor: Tremor present.   Psychiatric:         Mood and Affect: Mood normal.         Behavior: Behavior normal.         Results Review:  I reviewed the patient's new clinical results.  I reviewed the patient's new imaging results and agree with the interpretation.  I reviewed the patient's other test results and agree with the interpretation  I personally viewed and interpreted the patient's EKG/Telemetry  data    Lab Results (last 24 hours)     Procedure Component Value Units Date/Time    CBC & Differential [[id number]]  (Abnormal) Collected: 05/16/22 2209    Specimen: Blood Updated: 05/16/22 2233    Narrative:      The following orders were created for panel order CBC & Differential.  Procedure                               Abnormality         Status                     ---------                               -----------         ------                     CBC Auto Differential[[id number]]        Abnormal            Final result                 Please view results for these tests on the individual orders.    Comprehensive Metabolic Panel [[id number]]  (Abnormal) Collected: 05/16/22 2209    Specimen: Blood Updated: 05/16/22 2247     Glucose 91 mg/dL      BUN 4 mg/dL      Creatinine 0.63 mg/dL      Sodium 141 mmol/L      Potassium 3.5 mmol/L      Chloride 106 mmol/L      CO2 22.0 mmol/L      Calcium 7.9 mg/dL      Total Protein 6.2 g/dL      Albumin 4.00 g/dL      ALT (SGPT) 29 U/L      AST (SGOT) 21 U/L      Alkaline Phosphatase 78 U/L      Total Bilirubin 0.2 mg/dL      Globulin 2.2 gm/dL      A/G Ratio 1.8 g/dL      BUN/Creatinine Ratio 6.3     Anion Gap 13.0 mmol/L      eGFR 129.6 mL/min/1.73      Comment: National Kidney Foundation and American Society of Nephrology (ASN) Task Force recommended calculation based on the Chronic Kidney Disease Epidemiology Collaboration (CKD-EPI) equation refit without adjustment for race.       Narrative:      GFR Normal >60  Chronic Kidney Disease <60  Kidney Failure <15      Protime-INR [[id number]]  (Normal) Collected: 05/16/22 2209    Specimen: Blood Updated: 05/16/22 2254     Protime 13.5 Seconds      INR 1.04    Ethanol [[id number]]  (Abnormal) Collected: 05/16/22 2209    Specimen: Blood Updated: 05/16/22 2247     Ethanol 133 mg/dL      Ethanol % 0.133 %     Magnesium [[id number]]  (Normal) Collected: 05/16/22 2209    Specimen: Blood Updated: 05/16/22 2247     Magnesium 1.9 mg/dL      CBC Auto Differential [[id number]]  (Abnormal) Collected: 05/16/22 2209    Specimen: Blood Updated: 05/16/22 2233     WBC 4.86 10*3/mm3      RBC 4.55 10*6/mm3      Hemoglobin 14.7 g/dL      Hematocrit 43.2 %      MCV 94.9 fL      MCH 32.3 pg      MCHC 34.0 g/dL      RDW 13.8 %      RDW-SD 47.9 fl      MPV 10.1 fL      Platelets 201 10*3/mm3      Neutrophil % 48.2 %      Lymphocyte % 40.3 %      Monocyte % 5.8 %      Eosinophil % 3.7 %      Basophil % 1.6 %      Immature Grans % 0.4 %      Neutrophils, Absolute 2.34 10*3/mm3      Lymphocytes, Absolute 1.96 10*3/mm3      Monocytes, Absolute 0.28 10*3/mm3      Eosinophils, Absolute 0.18 10*3/mm3      Basophils, Absolute 0.08 10*3/mm3      Immature Grans, Absolute 0.02 10*3/mm3      nRBC 0.0 /100 WBC     COVID PRE-OP / PRE-PROCEDURE SCREENING ORDER (NO ISOLATION) - Swab, Nasopharynx [[id number]]  (Normal) Collected: 05/16/22 2211    Specimen: Swab from Nasopharynx Updated: 05/16/22 2317    Narrative:      The following orders were created for panel order COVID PRE-OP / PRE-PROCEDURE SCREENING ORDER (NO ISOLATION) - Swab, Nasopharynx.  Procedure                               Abnormality         Status                     ---------                               -----------         ------                     COVID-19,[facility] IN-HOUSE...[[id number]]  Normal              Final result                 Please view results for these tests on the individual orders.    COVID-19,[facility] IN-HOUSE CEPHEID/MEMO NP SWAB IN TRANSPORT MEDIA 8-12 HR TAT - Swab, Nasopharynx [[id number]]  (Normal) Collected: 05/16/22 2211    Specimen: Swab from Nasopharynx Updated: 05/16/22 2317     COVID19 Not Detected    Narrative:      Fact sheet for providers: https://www.fda.gov/media/786675/download     Fact sheet for patients: https://www.fda.gov/media/283677/download    Lipase [[id number]]  (Abnormal) Collected: 05/17/22 0134    Specimen: Blood Updated: 05/17/22 0151     Lipase 7 U/L     Urine Drug  Screen - Urine, Clean Catch [[id number]]  (Abnormal) Collected: 05/17/22 0229    Specimen: Urine, Clean Catch Updated: 05/17/22 0343     Amphet/Methamphet, Screen Negative     Barbiturates Screen, Urine Positive     Benzodiazepine Screen, Urine Positive     Cocaine Screen, Urine Negative     Opiate Screen Negative     THC, Screen, Urine Negative     Methadone Screen, Urine Negative     Oxycodone Screen, Urine Negative    Narrative:      Negative Thresholds Per Drugs Screened:    Amphetamines                 500 ng/ml  Barbiturates                 200 ng/ml  Benzodiazepines              100 ng/ml  Cocaine                      300 ng/ml  Methadone                    300 ng/ml  Opiates                      300 ng/ml  Oxycodone                    100 ng/ml  THC                           50 ng/ml    The Normal Value for all drugs tested is negative. This report includes final unconfirmed screening results to be used for medical treatment purposes only. Unconfirmed results must not be used for non-medical purposes such as employment or legal testing. Clinical consideration should be applied to any drug of abuse test, particularly when unconfirmed results are used.            Urinalysis With Microscopic If Indicated (No Culture) - Urine, Clean Catch [[id number]]  (Normal) Collected: 05/17/22 0229    Specimen: Urine, Clean Catch Updated: 05/17/22 0245     Color, UA Yellow     Appearance, UA Clear     pH, UA 5.5     Specific Gravity, UA 1.015     Glucose, UA Negative     Ketones, UA Negative     Bilirubin, UA Negative     Blood, UA Negative     Protein, UA Negative     Leuk Esterase, UA Negative     Nitrite, UA Negative     Urobilinogen, UA 0.2 E.U./dL    Narrative:      Urine microscopic not indicated.    CBC (No Diff) [[id number]]  (Normal) Collected: 05/17/22 0530    Specimen: Blood Updated: 05/17/22 0556     WBC 6.93 10*3/mm3      RBC 4.19 10*6/mm3      Hemoglobin 13.1 g/dL      Hematocrit 39.2 %      MCV 93.6 fL      MCH  31.3 pg      MCHC 33.4 g/dL      RDW 13.5 %      RDW-SD 45.3 fl      MPV 10.4 fL      Platelets 167 10*3/mm3     Basic Metabolic Panel [[id number]]  (Abnormal) Collected: 05/17/22 0617    Specimen: Blood Updated: 05/17/22 0647     Glucose 92 mg/dL      BUN 4 mg/dL      Creatinine 0.62 mg/dL      Sodium 136 mmol/L      Potassium 3.5 mmol/L      Chloride 103 mmol/L      CO2 21.9 mmol/L      Calcium 7.8 mg/dL      BUN/Creatinine Ratio 6.5     Anion Gap 11.1 mmol/L      eGFR 130.2 mL/min/1.73      Comment: National Kidney Foundation and American Society of Nephrology (ASN) Task Force recommended calculation based on the Chronic Kidney Disease Epidemiology Collaboration (CKD-EPI) equation refit without adjustment for race.       Narrative:      GFR Normal >60  Chronic Kidney Disease <60  Kidney Failure <15            Imaging Results (Last 24 Hours)     ** No results found for the last 24 hours. **              No orders to display        Assessment/Plan     Active Hospital Problems    Diagnosis  POA   • **Alcohol withdrawal syndrome with complication (HCC) [F10.239]  Yes   • Tobacco dependence due to cigarettes [F17.210]  Yes   • Seizure due to alcohol withdrawal (HCC) [F10.239, R56.9]  Yes   • Hypocalcemia [E83.51]  Yes      Resolved Hospital Problems   No resolved problems to display.       Mr. Mg is a 32 y.o. male smoker with a history of alcohol abuse, previous seizure who is admitted for alcohol withdrawal    -Monitor on telemetry. Most recent CIWA score 10. Follow CIWA protocol. Access consult. Vitamin repletion. IVF's. Antiemetics as needed. Continue librium. Add pepcid for GI prophylaxis  -CXR due to c/o shortness of breath, cough  -Ca 7.8. Check Vit D, Mg  -Previous seizure due to alcohol withdrawal; close monitoring  -Plan to discharge to [facility] once medically stable  -Refuses need for nicotine patch. Encourage cessation, IS  -Further recommendations based on hospital course      I discussed  the patient's findings and my recommendations with patient.    VTE Prophylaxis - Pharmacy to dose Lovenox.  Code Status - Full code.       SHAYY Moser  [facility]  05/17/22  10:26 EDT

## 2022-05-17 NOTE — ED PROVIDER NOTES
The GISEL and I have discussed this patient's history, physical exam and treatment plan.  I provided a substantive portion of the care of this patient.  I have reviewed the documentation and personally had a face to face interaction with the patient and personally performed the physical exam, in its entirety.  I affirm the documentation and agree with the treatment and plan.  The following describes my personal findings.      The patient presents complaining of nausea, vomiting, epigastric pain, anxiety worsening since this morning.  Patient reports he would like to quit drinking, relapsed 1 week ago, drinking daily since then until approximately 24 hours ago.  Patient reports he hears music playing very loudly in his ears at times that he knows is not actually playing.    Comprehensive Physical exam:  Patient is nontoxic appearing mildly anxious, tremulous, oriented awake, alert  HEENT: normocephalic, atraumatic  Neck: No JVD no goiter, no pain with ROM  Pulmonary: Nontachypneic, breath sounds heard well bilaterally  cardiovascular: Heart rate in the 70s, regular  Abdomen: Soft, nontender  musculoskeletal: Good range of motion, pulse, sensation x4  Neuro/psychiatric:calm, appropriate, cooperative, Achilles and patellar reflexes 2+ equal bilaterally, no clonus  Skin:warm, dry    CIWA 10 at the time of exam  Anticipate admission for alcohol withdrawal given severity of patient's symptoms,    Patient was wearing facemask when I entered the room and throughout our encounter. Full protective equipment was worn throughout this patient encounter including a face mask, eye protection and gloves. Hand hygiene was performed before donning protective equipment and after removal when leaving the room.           Aileen Cifuentes MD  05/17/22 5704

## 2022-05-17 NOTE — PROGRESS NOTES
"[facility] Clinical Pharmacy Services: Enoxaparin Consult    Davonte Mg has a pharmacy consult to dose prophylactic enoxaparin per SHAYY Galvez with [facility]'s request.     Indication: VTE prophylaxix  Home Anticoagulation: None     Relevant clinical data and objective history reviewed:  32 y.o. male 175.3 cm (69\") 80.7 kg (178 lb)   Body mass index is 26.29 kg/m².   Results from last 7 days   Lab Units 05/17/22  0530   PLATELETS 10*3/mm3 167     Estimated Creatinine Clearance: 195.2 mL/min (A) (by C-G formula based on SCr of 0.62 mg/dL (L)).    Assessment/Plan    Will start patient on 40mg subcutaneous every 24 hours, adjusted for renal function. Consult order will be discontinued but pharmacy will continue to follow.     Harriet Nava, PharmD, [facility]  Clinical Pharmacy Specialist, Emergency Medicine   Phone: [phone]      "

## 2022-05-17 NOTE — ED NOTES
Spoke with Sharon at the [facility] ([facility]) in regards to pt's current status. This RN was informed that staff from [facility] will pick pt up once he is to be discharged. Direct # is [phone].

## 2022-05-17 NOTE — CASE MANAGEMENT/SOCIAL WORK
Discharge Planning Assessment  [facility]     Patient Name: Davonte Mg  MRN: [id number]  Today's Date: 5/17/2022    Admit Date: 5/16/2022     Discharge Needs Assessment     Row Name 05/17/22 1442       Living Environment    People in Home grandchild(georgette);parent(s)    Name(s) of People in Home Mother  ( Iris Mg [phone]) and grandmother  (Shannan Franz)    Current Living Arrangements home    Primary Care Provided by self    Provides Primary Care For no one    Family Caregiver if Needed grandparent(s);parent(s)    Family Caregiver Names Mother  ( Iris Mg [phone]) and grandmother  (Shannan Franz)    Quality of Family Relationships involved;helpful;supportive    Able to Return to Prior Arrangements yes    Living Arrangement Comments Pt lives in a single story house with his mother  ( Iris Mg [phone]) and grandmother  (Shannan Franz).       Resource/Environmental Concerns    Resource/Environmental Concerns none    Transportation Concerns no car       Transition Planning    Patient/Family Anticipates Transition to inpatient rehabilitation facility    Patient/Family Anticipated Services at Transition mental health services    Transportation Anticipated family or friend will provide       Discharge Needs Assessment    Current Outpatient/Agency/Support Group inpatient rehabilitation facility    Equipment Currently Used at Home none    Concerns to be Addressed substance/tobacco abuse/use    Anticipated Changes Related to Illness none    Equipment Needed After Discharge none               Discharge Plan     Row Name 05/17/22 1444       Plan    Plan Plan return to the [facility] for [facility].  STACEY Doe RN    Patient Needs State Guardianship? Yes    Plan Comments FACE SHEET VERIFIED.  Spoke with pt at bedside.  Pt recently moved from [place] to [place] to live with his mother and grandmother.  Pt lives in a single story house with his mother  ( Iris Mg [phone])  and grandmother  (Shannan Franz).  Pt is independent with ADLs.   Pt gets his prescriptions at [facility] but does not have a local pharmacy.  Pt was admitted from [facility].  Called and spoke with Lazaro  ( [phone]) at [facility].  He states pt can return when ready for discharge.  Plan return to the [facility].  STACEY Doe RN              Continued Care and Services - Admitted Since 5/16/2022    Coordination has not been started for this encounter.          Demographic Summary     Row Name 05/17/22 1440       General Information    Admission Type inpatient    Arrived From emergency department    Referral Source admission list    Reason for Consult discharge planning    Preferred Language English               Functional Status     Row Name 05/17/22 1440       Functional Status    Usual Activity Tolerance good    Current Activity Tolerance moderate       Functional Status, IADL    Medications independent    Meal Preparation independent    Housekeeping independent    Laundry independent    Shopping independent       Mental Status    General Appearance WDL WDL               Psychosocial    No documentation.                Abuse/Neglect    No documentation.                Legal    No documentation.                Substance Abuse    No documentation.                Patient Forms    No documentation.                   Radha Doe, RN

## 2022-05-17 NOTE — ED NOTES
Admitting physician, Dr. Teixeira, aware of last several hours of high CIWA scores. MD still wants patient to go to assigned room on Telemetry 6E.

## 2022-05-18 VITALS
WEIGHT: 178 LBS | HEIGHT: 69 IN | TEMPERATURE: 98 F | RESPIRATION RATE: 18 BRPM | HEART RATE: 70 BPM | SYSTOLIC BLOOD PRESSURE: 137 MMHG | BODY MASS INDEX: 26.36 KG/M2 | DIASTOLIC BLOOD PRESSURE: 100 MMHG | OXYGEN SATURATION: 97 %

## 2022-05-18 PROBLEM — E55.9 VITAMIN D DEFICIENCY: Status: ACTIVE | Noted: 2022-05-18

## 2022-05-18 LAB
ALBUMIN SERPL-MCNC: 3.8 G/DL (ref 3.5–5.2)
ALBUMIN/GLOB SERPL: 2.1 G/DL
ALP SERPL-CCNC: 76 U/L (ref 39–117)
ALT SERPL W P-5'-P-CCNC: 22 U/L (ref 1–41)
ANION GAP SERPL CALCULATED.3IONS-SCNC: 9 MMOL/L (ref 5–15)
AST SERPL-CCNC: 14 U/L (ref 1–40)
BILIRUB SERPL-MCNC: 0.4 MG/DL (ref 0–1.2)
BUN SERPL-MCNC: 3 MG/DL (ref 6–20)
BUN/CREAT SERPL: 3.9 (ref 7–25)
CALCIUM SPEC-SCNC: 8.4 MG/DL (ref 8.6–10.5)
CHLORIDE SERPL-SCNC: 104 MMOL/L (ref 98–107)
CO2 SERPL-SCNC: 23 MMOL/L (ref 22–29)
CREAT SERPL-MCNC: 0.77 MG/DL (ref 0.76–1.27)
DEPRECATED RDW RBC AUTO: 45.3 FL (ref 37–54)
EGFRCR SERPLBLD CKD-EPI 2021: 122 ML/MIN/1.73
ERYTHROCYTE [DISTWIDTH] IN BLOOD BY AUTOMATED COUNT: 13.6 % (ref 12.3–15.4)
GLOBULIN UR ELPH-MCNC: 1.8 GM/DL
GLUCOSE SERPL-MCNC: 126 MG/DL (ref 65–99)
HCT VFR BLD AUTO: 42.2 % (ref 37.5–51)
HGB BLD-MCNC: 14.2 G/DL (ref 13–17.7)
MCH RBC QN AUTO: 31.4 PG (ref 26.6–33)
MCHC RBC AUTO-ENTMCNC: 33.6 G/DL (ref 31.5–35.7)
MCV RBC AUTO: 93.4 FL (ref 79–97)
PLATELET # BLD AUTO: 156 10*3/MM3 (ref 140–450)
PMV BLD AUTO: 10.6 FL (ref 6–12)
POTASSIUM SERPL-SCNC: 3.5 MMOL/L (ref 3.5–5.2)
PROCALCITONIN SERPL-MCNC: 0.09 NG/ML (ref 0–0.25)
PROT SERPL-MCNC: 5.6 G/DL (ref 6–8.5)
RBC # BLD AUTO: 4.52 10*6/MM3 (ref 4.14–5.8)
SODIUM SERPL-SCNC: 136 MMOL/L (ref 136–145)
WBC NRBC COR # BLD: 4.18 10*3/MM3 (ref 3.4–10.8)

## 2022-05-18 PROCEDURE — 80053 COMPREHEN METABOLIC PANEL: CPT | Performed by: NURSE PRACTITIONER

## 2022-05-18 PROCEDURE — 85027 COMPLETE CBC AUTOMATED: CPT | Performed by: NURSE PRACTITIONER

## 2022-05-18 PROCEDURE — 90791 PSYCH DIAGNOSTIC EVALUATION: CPT

## 2022-05-18 PROCEDURE — 84145 PROCALCITONIN (PCT): CPT | Performed by: NURSE PRACTITIONER

## 2022-05-18 PROCEDURE — 63710000001 ONDANSETRON PER 8 MG: Performed by: NURSE PRACTITIONER

## 2022-05-18 PROCEDURE — 36415 COLL VENOUS BLD VENIPUNCTURE: CPT | Performed by: NURSE PRACTITIONER

## 2022-05-18 PROCEDURE — 25010000002 LORAZEPAM PER 2 MG: Performed by: NURSE PRACTITIONER

## 2022-05-18 RX ORDER — ALUMINA, MAGNESIA, AND SIMETHICONE 2400; 2400; 240 MG/30ML; MG/30ML; MG/30ML
15 SUSPENSION ORAL EVERY 6 HOURS PRN
Status: DISCONTINUED | OUTPATIENT
Start: 2022-05-18 | End: 2022-05-18 | Stop reason: HOSPADM

## 2022-05-18 RX ORDER — PANTOPRAZOLE SODIUM 40 MG/1
40 TABLET, DELAYED RELEASE ORAL
Status: DISCONTINUED | OUTPATIENT
Start: 2022-05-18 | End: 2022-05-18 | Stop reason: HOSPADM

## 2022-05-18 RX ORDER — MELATONIN
1000 DAILY
Status: DISCONTINUED | OUTPATIENT
Start: 2022-05-18 | End: 2022-05-18 | Stop reason: HOSPADM

## 2022-05-18 RX ORDER — LIDOCAINE HYDROCHLORIDE 20 MG/ML
10 SOLUTION OROPHARYNGEAL ONCE
Status: COMPLETED | OUTPATIENT
Start: 2022-05-18 | End: 2022-05-18

## 2022-05-18 RX ORDER — CHOLECALCIFEROL (VITAMIN D3) 125 MCG
5 CAPSULE ORAL NIGHTLY
Status: DISCONTINUED | OUTPATIENT
Start: 2022-05-18 | End: 2022-05-18 | Stop reason: HOSPADM

## 2022-05-18 RX ORDER — TRAZODONE HYDROCHLORIDE 50 MG/1
50 TABLET ORAL ONCE
Status: COMPLETED | OUTPATIENT
Start: 2022-05-18 | End: 2022-05-18

## 2022-05-18 RX ADMIN — LIDOCAINE HYDROCHLORIDE 10 ML: 20 SOLUTION ORAL; TOPICAL at 12:15

## 2022-05-18 RX ADMIN — Medication 1000 UNITS: at 13:11

## 2022-05-18 RX ADMIN — LORAZEPAM 2 MG: 1 TABLET ORAL at 10:09

## 2022-05-18 RX ADMIN — LORAZEPAM 2 MG: 1 TABLET ORAL at 12:58

## 2022-05-18 RX ADMIN — LORAZEPAM 2 MG: 1 TABLET ORAL at 08:03

## 2022-05-18 RX ADMIN — CHLORDIAZEPOXIDE HYDROCHLORIDE 25 MG: 25 CAPSULE ORAL at 08:04

## 2022-05-18 RX ADMIN — FAMOTIDINE 20 MG: 10 INJECTION INTRAVENOUS at 08:04

## 2022-05-18 RX ADMIN — Medication 1 TABLET: at 08:04

## 2022-05-18 RX ADMIN — FOLIC ACID 1 MG: 1 TABLET ORAL at 08:04

## 2022-05-18 RX ADMIN — CHLORDIAZEPOXIDE HYDROCHLORIDE 25 MG: 25 CAPSULE ORAL at 13:11

## 2022-05-18 RX ADMIN — Medication 10 ML: at 08:04

## 2022-05-18 RX ADMIN — LORAZEPAM 2 MG: 2 INJECTION INTRAMUSCULAR; INTRAVENOUS at 15:32

## 2022-05-18 RX ADMIN — TRAZODONE HYDROCHLORIDE 50 MG: 50 TABLET ORAL at 02:00

## 2022-05-18 RX ADMIN — LORAZEPAM 2 MG: 2 INJECTION INTRAMUSCULAR; INTRAVENOUS at 13:54

## 2022-05-18 RX ADMIN — Medication 100 MG: at 08:04

## 2022-05-18 RX ADMIN — ONDANSETRON HYDROCHLORIDE 4 MG: 4 TABLET, FILM COATED ORAL at 08:08

## 2022-05-18 RX ADMIN — ALUMINUM HYDROXIDE, MAGNESIUM HYDROXIDE, AND DIMETHICONE 15 ML: 400; 400; 40 SUSPENSION ORAL at 10:09

## 2022-05-18 RX ADMIN — LORAZEPAM 2 MG: 1 TABLET ORAL at 02:13

## 2022-05-18 NOTE — NURSING NOTE
Called the Rehab Facility to let them know he left AMA and that he will not be coming back to them.  They said that they will let Rebecca know that is charge of his care.

## 2022-05-18 NOTE — PLAN OF CARE
Goal Outcome Evaluation:  Plan of Care Reviewed With: patient           Outcome Evaluation: Cont with TEVIN pereira.  Family came to visit family and pt seems very anxious and go very quiet.  Will cont to monitor the situation.  IV Access is needed after one was just inserted into r. hand.  IVF decreased to 75ml/hr.  BP elevated once pt got anxious about mother coming up to room.  Possible DC tomorrow to Rehab if pt s/s of withdrawals is completed.

## 2022-05-18 NOTE — NURSING NOTE
The mother and grandmother were in to visit the pt.  The pt is getting very anxious and he told them as well as me, that he is not ready to be sober and wants to move back to [place], [place] and figure out how to get to his car in [place].  Their relationship is very toxic.  She was being very cold to him and said that he was manipulating her to get his cell phone.  Will cont to monitor.

## 2022-05-18 NOTE — CONSULTS
" Patient is 32 year old male evaluated by Access due to alcohol use. Patient to ER on 5/16 from [facility] due to withdrawal, they were unable to treat his symptoms with oral medications and they do not place IV's for medication management for withdrawal. Patient admitted to this facility per chart history related to seizure. Per primary RN, patient's anxiety and irritability are increasing, worse after visit from family. Patient stated to her he does not wish to be sober. Most recent CIWA 16. Upon entering room, patient is sitting in bed on his phone, staring at well. He makes little to no eye contact during interview, appears restless, tremors present, affect flat and he is irritable. When introducing myself and my role, he states \"that's okay I'm leaving, I don't want to be here.\" Attempted to gather more information from patient, which he declined to provide in most circumstances due to inappropriately being focused upon finding a ride to his car. He states he drinks 8 shots per day, chart history reveals 25-30, UDS positive for benzodiazapine's and barbiturates despite patient denying drug use. When patient is asked his reasoning behind seeking rehab initially, he states \"I don't know.\" He voices recent breakup from girlfriend causing depression. He admits to taking Zyprexa 20 mg daily and Zoloft 200 mg daily, prescribed by psychiatrist in [place], declines resources for psychiatry currently. He recently moved here from [place] and now wishes to return there to other family members, states \"I don't want to be sober.\" Lengthy amount of time spent discussing possible circumstances of withdrawal and inability to manage safely at home, that could ultimately lead to harm and death of the patient, he states \"I can handle it, I come from a long list of family who drink.\" Discussed need for possible intubation he would not be able to perform on his own. He states \"you can try all you want but if I'm not " "ready, I'm not ready.\" Patient is asked if he wants to be dead, he states \"no\" despite discussion regarding seriousness of leaving AMA. He denies previous or current suicidal thoughts or behavior, or hallucinations. When asked about symptoms consistent with withdrawal, he states \"nope I feel fine.\" He then calls his mom and states \"please pick me up...it's not for you to decide...so I'm going to have to hitchhike back from [place] again.\" Patient was asked to clarify, stated he hitchhiked yesterday.   Patient is single with no children. He denies legalities and currently resides with his mother and grandmother in their home. 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 When asked of hobbies, he states \"nuthin, drinkin,\" [employer] preference. He declines resources for alternate placement in residential or IOP. He states \"I'm gonna take my IV out\" and his primary RN was notified, whom later notified house. Access will sign off due to patient declining resources and informing he is not a threat to himself or others, or hallucinating. Please re-consult if future needs should arise.  "

## 2022-05-18 NOTE — PROGRESS NOTES
Name: Davonte Mg ADMIT: 2022   : 1990  PCP: Provider, No Known    MRN: [id number] LOS: 1 days   AGE/SEX: 32 y.o. male  ROOM: [facility]/     Subjective   Subjective   C/O burning/pain lower abdomen w/mild nausea. No further vomiting. BM yesterday. Still w/mild tremors, diaphoresis. Most recent CIWA 11    Review of Systems   Constitutional: Positive for diaphoresis. Negative for chills and fever.   HENT: Negative for congestion.    Respiratory: Negative for shortness of breath.    Cardiovascular: Negative for chest pain.   Gastrointestinal: Positive for abdominal pain and nausea. Negative for constipation and vomiting.   Genitourinary: Negative for difficulty urinating.   Musculoskeletal: Negative for arthralgias and myalgias.   Skin: Negative for rash.   Neurological: Positive for tremors.   Psychiatric/Behavioral: Positive for sleep disturbance.        Objective   Objective   Vital Signs  Temp:  [98 °F (36.7 °C)-98.7 °F (37.1 °C)] 98 °F (36.7 °C)  Heart Rate:  [58-70] 70  Resp:  [18] 18  BP: (111-137)/() 137/100  SpO2:  [93 %-97 %] 97 %  on  Flow (L/min):  [2] 2;   Device (Oxygen Therapy): room air  Body mass index is 26.29 kg/m².  Physical Exam  Vitals and nursing note reviewed.   Constitutional:       General: He is not in acute distress.     Appearance: He is ill-appearing and diaphoretic. He is not toxic-appearing.   HENT:      Head: Normocephalic.      Mouth/Throat:      Mouth: Mucous membranes are moist.   Eyes:      Conjunctiva/sclera: Conjunctivae normal.   Cardiovascular:      Rate and Rhythm: Normal rate and regular rhythm.   Pulmonary:      Effort: Pulmonary effort is normal. No respiratory distress.      Breath sounds: Normal breath sounds.   Abdominal:      General: Bowel sounds are normal. There is no distension.      Palpations: Abdomen is soft.   Musculoskeletal:      Cervical back: Neck supple.      Right lower leg: No edema.      Left lower leg: No edema.   Skin:     General: Skin  is warm.   Neurological:      Mental Status: He is alert and oriented to person, place, and time.      Motor: Tremor present.   Psychiatric:         Mood and Affect: Mood normal.         Behavior: Behavior normal.         Results Review     I reviewed the patient's new clinical results.  Results from last 7 days   Lab Units 05/18/22  0855 05/17/22  0530 05/16/22  2209   WBC 10*3/mm3 4.18 6.93 4.86   HEMOGLOBIN g/dL 14.2 13.1 14.7   PLATELETS 10*3/mm3 156 167 201     Results from last 7 days   Lab Units 05/18/22  0855 05/17/22  0617 05/16/22  2209   SODIUM mmol/L 136 136 141   POTASSIUM mmol/L 3.5 3.5 3.5   CHLORIDE mmol/L 104 103 106   CO2 mmol/L 23.0 21.9* 22.0   BUN mg/dL 3* 4* 4*   CREATININE mg/dL 0.77 0.62* 0.63*   GLUCOSE mg/dL 126* 92 91   EGFR mL/min/1.73 122.0 130.2 129.6     Results from last 7 days   Lab Units 05/18/22  0855 05/16/22  2209   ALBUMIN g/dL 3.80 4.00   BILIRUBIN mg/dL 0.4 0.2   ALK PHOS U/L 76 78   AST (SGOT) U/L 14 21   ALT (SGPT) U/L 22 29     Results from last 7 days   Lab Units 05/18/22  0855 05/17/22 0617 05/16/22  2209   CALCIUM mg/dL 8.4* 7.8* 7.9*   ALBUMIN g/dL 3.80  --  4.00   MAGNESIUM mg/dL  --  1.7 1.9       No results found for: HGBA1C, POCGLU    XR Chest 1 View    Result Date: 5/17/2022  Subtle increased, hazy opacity in the peripheral aspect of the left lung base, potentially due to a small infiltrate in the proper clinical setting. No further evidence for active disease in the chest.  This report was finalized on 5/17/2022 11:02 AM by Dr. Julio Blood M.D.      Scheduled Medications  chlordiazePOXIDE, 25 mg, Oral, Q8H  cholecalciferol, 1,000 Units, Oral, Daily  enoxaparin, 40 mg, Subcutaneous, Nightly  famotidine, 20 mg, Intravenous, Q12H  thiamine, 100 mg, Oral, Daily   And  multivitamin, 1 tablet, Oral, Daily   And  folic acid, 1 mg, Oral, Daily  sodium chloride, 10 mL, Intravenous, Q12H    Infusions  sodium chloride, 100 mL/hr, Last Rate: 100 mL/hr (05/17/22  0416)    Diet  Diet Regular       Assessment/Plan     Active Hospital Problems    Diagnosis  POA   • **Alcohol withdrawal syndrome with complication (HCC) [F10.239]  Yes   • Vitamin D deficiency [E55.9]  Yes   • Tobacco dependence due to cigarettes [F17.210]  Yes   • Seizure due to alcohol withdrawal (HCC) [F10.239, R56.9]  Yes   • Hypocalcemia [E83.51]  Yes      Resolved Hospital Problems   No resolved problems to display.       32 y.o. male admitted with Alcohol withdrawal syndrome with complication (HCC).      Mr. Mg is a 32 y.o. male smoker with a history of alcohol abuse, previous seizure who is admitted for alcohol withdrawal     -Monitor on telemetry. Most recent CIWA score 11. Follow CIWA protocol. Access consult. Vitamin repletion. IVF's. Antiemetics as needed. Continue librium. Change pepcid to PPI. Viscous lidocaine x 1 for abd discomfort, TUMS prn  -CXR completed showing potential small infiltrate lt lung base. Afebrile. On room air. WBC wnl. Check procal  -Ca 7.8 with low Vit D, started on daily Vit D supplement  -Previous seizure due to alcohol withdrawal; close monitoring  -Plan to discharge to [facility] once medically stable, likely tomorrow  -Refuses need for nicotine patch. Encourage cessation, IS    · Lovenox 40 mg SC daily for DVT prophylaxis.  · Full code.  · Discussed with patient and nursing staff.  · Anticipate discharge [facility] tomorrow.      SHAYY Moser  [facility]  05/18/22  14:21 EDT

## 2022-05-18 NOTE — PLAN OF CARE
Goal Outcome Evaluation:      Pt resting in bed. CIWA scores as noted, medicated accordingly. No s/s of distress cont to monitor.

## 2022-05-18 NOTE — NURSING NOTE
Pt is leaving AMA.  Called his mother to beg for a room to sleep in [facility].  IV taken out and pt wanted to know how much Ativan that I have given him today so that he would not overdose while drinking alcohol.

## 2022-05-19 NOTE — CASE MANAGEMENT/SOCIAL WORK
Case Management Discharge Note      Final Note: Pt left AMA on 5/18.   STACEY Doe RN         Selected Continued Care - Discharged on 5/18/2022 Admission date: 5/16/2022 - Discharge disposition: Left Against Medical Advice    Destination    No services have been selected for the patient.              Durable Medical Equipment    No services have been selected for the patient.              Dialysis/Infusion    No services have been selected for the patient.              Home Medical Care    No services have been selected for the patient.              Therapy    No services have been selected for the patient.              Community Resources    No services have been selected for the patient.              Community & DME    No services have been selected for the patient.                       Final Discharge Disposition Code: 07 - left AMA

## 2022-05-24 NOTE — PAYOR COMM NOTE
"Davonte Mg (32 y.o. Male)     ATTN: INITIAL CLINICALS TO REVIEW FOR INPATIENT ADMISSION: [id number]    PLEASE REPLY TO UR DEPT: [phone],  [phone]               Date of Birth   1990    Social Security Number       Address   [address] [address]    Home Phone   [phone]    MRN   [id number]       Quaker   None    Marital Status   Single                            Admission Date   5/16/22    Admission Type   Emergency    Admitting Provider   Cuco Teixeira MD    Attending Provider       Department, Room/Bed   [address], E656/1       Discharge Date   5/18/2022    Discharge Disposition   Left Against Medical Advice    Discharge Destination                               Attending Provider: (none)   Allergies: No Known Allergies    Isolation: None   Infection: None   Code Status: Prior   Advance Care Planning Activity    Ht: 175.3 cm (69\")   Wt: 80.7 kg (178 lb)    Admission Cmt: None   Principal Problem: Alcohol withdrawal syndrome with complication (HCC) [F10.239]                 Active Insurance as of 5/16/2022     Primary Coverage     Payor Plan Insurance Group Employer/Plan Group    [facility] COMMUNITY PLAN [facility] COMMUNITY PLAN [facility]     Payor Plan Address Payor Plan Phone Number Payor Plan Fax Number Effective Dates    [address]   5/1/2022 - None Entered    [facility] [id number]       Subscriber Name Subscriber Birth Date Member ID       DAVONTE MG 1990 [id number]                 Emergency Contacts      (Rel.) Home Phone Work Phone Mobile Phone    PHYLLIS MG (Mother) [phone] -- [phone]               History & Physical      Mary Carmen Stearns APRN at 05/17/22 0900     Attestation signed by Cuco Teixeira MD at 05/17/22 3696    Addendum: I have reviewed the history and plan as obtained by SHAYY Galvez and performed my own independent history. I have personally examined the " patient and conducted greater than 50% of medical decision making. My exam confirms her physical findings and I agree with the plan as listed above, with the addition of the following:     This is a 32-year-old male who suffers from alcoholism and presented to the emergency room with alcohol withdrawal.  Presently he is tremulous but awake and oriented x3.  Has required quite a bit of Ativan this morning with better control of symptoms at present.  On exam he is acutely ill-appearing but not septic or toxic.  Awake and oriented x3.  He is generally tremulous.  No hallucinations that I can detect.  Heart is regular and lungs are clear.  Abdominal exam is unremarkable and he has no significant peripheral edema.  It is noted that he has required a large amount of Ativan since presenting to the emergency room but despite this he is very calm, cooperative, and not delirious at all right now.  We will continue to monitor CIWA scores and treat appropriately.  Supportive care with IV fluids.  Will monitor closely for any alcohol withdrawal seizures.  Start him on thiamine, folate, multivitamin.  Continue Librium.  PPI for GI prophylaxis and any alcoholic gastritis.  Should he begin having significant delirium along with persistently elevated CIWA scores and Ativan requirements then will have a low threshold for placement in the ICU.  Additional plans based on his clinical course    Cuco Teixeira MD  [facility]  05/17/22  13:54 EDT                        Patient Name:  Davonte Mg  YOB: 1990  MRN:  [id number]  Admit Date:  5/16/2022  Patient Care Team:  Provider, No Known as PCP - General      Subjective   History Present Illness     Chief Complaint   Patient presents with   • Withdrawal       Mr. Mg is a 32 y.o. male smoker with a history of alcohol abuse, previous seizure that presents to [facility] complaining of withdrawal. He has been drinking heavily  up to 20-25 shots/day. He was recently admitted to [facility] in [place], discharged 5/14, w/plan to be admitted to [facility] for alcohol abuse. He has attempted to decrease his drinking but has had withdrawal symptoms of anxiety, n/v, diaphoresis, epigastric pain, hallucinations. He has had seizures due to alcohol withdrawal. Denies recent fever, chest pain. He has mild shortness of breath and productive cough.    Afebrile. HR controlled. BP stable. WBC 4.86, Hgb 14.7. Mg 1.9. Ethanol 133. BUN/Cr 4/0.63, Ca 7.9; remaining chem panel wnl. Covid neg. Lipase 7. UA neg. UDS barbituates, benzo. No imaging in ED    Review of Systems   Constitutional: Positive for diaphoresis. Negative for fever.   HENT: Negative for congestion.    Respiratory: Positive for cough and shortness of breath.    Cardiovascular: Negative for chest pain.   Gastrointestinal: Positive for abdominal pain, nausea and vomiting.   Genitourinary: Negative for difficulty urinating.   Musculoskeletal: Negative for arthralgias and myalgias.   Skin: Negative for rash.   Neurological: Positive for tremors.   Psychiatric/Behavioral: Positive for hallucinations. The patient is nervous/anxious.         Personal History     History reviewed. No pertinent past medical history.  Past Surgical History:   Procedure Laterality Date   • KNEE SURGERY Right      History reviewed. No pertinent family history.  Social History     Tobacco Use   • Smoking status: Current Every Day Smoker     Packs/day: 0.50     Types: Cigarettes   • Smokeless tobacco: Never Used   Substance Use Topics   • Alcohol use: Yes     Alcohol/week: 210.0 standard drinks     Types: 210 Shots of liquor per week     No current facility-administered medications on file prior to encounter.     No current outpatient medications on file prior to encounter.     No Known Allergies    Objective    Objective     Vital Signs  Temp:  [98.4 °F (36.9 °C)] 98.4 °F (36.9 °C)  Heart Rate:   [63-92] 66  Resp:  [28] 28  BP: (115-143)/(66-94) 121/71  SpO2:  [92 %-98 %] 96 %  on  Flow (L/min):  [2] 2;   Device (Oxygen Therapy): nasal cannula  Body mass index is 26.29 kg/m².    Physical Exam  Vitals and nursing note reviewed.   Constitutional:       General: He is not in acute distress.     Appearance: He is ill-appearing.   HENT:      Head: Normocephalic.      Mouth/Throat:      Mouth: Mucous membranes are moist.   Eyes:      Conjunctiva/sclera: Conjunctivae normal.   Cardiovascular:      Rate and Rhythm: Normal rate and regular rhythm.   Pulmonary:      Effort: Pulmonary effort is normal. No respiratory distress.      Breath sounds: Examination of the left-lower field reveals decreased breath sounds. Decreased breath sounds present.   Abdominal:      General: Bowel sounds are normal.      Palpations: Abdomen is soft.   Musculoskeletal:      Cervical back: Neck supple.      Right lower leg: No edema.      Left lower leg: No edema.   Skin:     General: Skin is warm and dry.   Neurological:      Mental Status: He is alert and oriented to person, place, and time.      Motor: Tremor present.   Psychiatric:         Mood and Affect: Mood normal.         Behavior: Behavior normal.         Results Review:  I reviewed the patient's new clinical results.  I reviewed the patient's new imaging results and agree with the interpretation.  I reviewed the patient's other test results and agree with the interpretation  I personally viewed and interpreted the patient's EKG/Telemetry data    Lab Results (last 24 hours)     Procedure Component Value Units Date/Time    CBC & Differential [[id number]]  (Abnormal) Collected: 05/16/22 2209    Specimen: Blood Updated: 05/16/22 2233    Narrative:      The following orders were created for panel order CBC & Differential.  Procedure                               Abnormality         Status                     ---------                               -----------         ------                      CBC Auto Differential[[id number]]        Abnormal            Final result                 Please view results for these tests on the individual orders.    Comprehensive Metabolic Panel [[id number]]  (Abnormal) Collected: 05/16/22 2209    Specimen: Blood Updated: 05/16/22 2247     Glucose 91 mg/dL      BUN 4 mg/dL      Creatinine 0.63 mg/dL      Sodium 141 mmol/L      Potassium 3.5 mmol/L      Chloride 106 mmol/L      CO2 22.0 mmol/L      Calcium 7.9 mg/dL      Total Protein 6.2 g/dL      Albumin 4.00 g/dL      ALT (SGPT) 29 U/L      AST (SGOT) 21 U/L      Alkaline Phosphatase 78 U/L      Total Bilirubin 0.2 mg/dL      Globulin 2.2 gm/dL      A/G Ratio 1.8 g/dL      BUN/Creatinine Ratio 6.3     Anion Gap 13.0 mmol/L      eGFR 129.6 mL/min/1.73      Comment: National Kidney Foundation and American Society of Nephrology (ASN) Task Force recommended calculation based on the Chronic Kidney Disease Epidemiology Collaboration (CKD-EPI) equation refit without adjustment for race.       Narrative:      GFR Normal >60  Chronic Kidney Disease <60  Kidney Failure <15      Protime-INR [[id number]]  (Normal) Collected: 05/16/22 2209    Specimen: Blood Updated: 05/16/22 2254     Protime 13.5 Seconds      INR 1.04    Ethanol [[id number]]  (Abnormal) Collected: 05/16/22 2209    Specimen: Blood Updated: 05/16/22 2247     Ethanol 133 mg/dL      Ethanol % 0.133 %     Magnesium [[id number]]  (Normal) Collected: 05/16/22 2209    Specimen: Blood Updated: 05/16/22 2247     Magnesium 1.9 mg/dL     CBC Auto Differential [[id number]]  (Abnormal) Collected: 05/16/22 2209    Specimen: Blood Updated: 05/16/22 2233     WBC 4.86 10*3/mm3      RBC 4.55 10*6/mm3      Hemoglobin 14.7 g/dL      Hematocrit 43.2 %      MCV 94.9 fL      MCH 32.3 pg      MCHC 34.0 g/dL      RDW 13.8 %      RDW-SD 47.9 fl      MPV 10.1 fL      Platelets 201 10*3/mm3      Neutrophil % 48.2 %      Lymphocyte % 40.3 %      Monocyte % 5.8 %      Eosinophil % 3.7 %       Basophil % 1.6 %      Immature Grans % 0.4 %      Neutrophils, Absolute 2.34 10*3/mm3      Lymphocytes, Absolute 1.96 10*3/mm3      Monocytes, Absolute 0.28 10*3/mm3      Eosinophils, Absolute 0.18 10*3/mm3      Basophils, Absolute 0.08 10*3/mm3      Immature Grans, Absolute 0.02 10*3/mm3      nRBC 0.0 /100 WBC     COVID PRE-OP / PRE-PROCEDURE SCREENING ORDER (NO ISOLATION) - Swab, Nasopharynx [[id number]]  (Normal) Collected: 05/16/22 2211    Specimen: Swab from Nasopharynx Updated: 05/16/22 2317    Narrative:      The following orders were created for panel order COVID PRE-OP / PRE-PROCEDURE SCREENING ORDER (NO ISOLATION) - Swab, Nasopharynx.  Procedure                               Abnormality         Status                     ---------                               -----------         ------                     COVID-19,[facility]-HOUSE...[[id number]]  Normal              Final result                 Please view results for these tests on the individual orders.    COVID-19,[facility]-HOUSE CEPHEID/MEMO NP SWAB IN TRANSPORT MEDIA 8-12 HR TAT - Swab, Nasopharynx [[id number]]  (Normal) Collected: 05/16/22 2211    Specimen: Swab from Nasopharynx Updated: 05/16/22 2317     COVID19 Not Detected    Narrative:      Fact sheet for providers: https://www.fda.gov/media/826710/download     Fact sheet for patients: https://www.fda.gov/media/187710/download    Lipase [[id number]]  (Abnormal) Collected: 05/17/22 0134    Specimen: Blood Updated: 05/17/22 0151     Lipase 7 U/L     Urine Drug Screen - Urine, Clean Catch [[id number]]  (Abnormal) Collected: 05/17/22 0229    Specimen: Urine, Clean Catch Updated: 05/17/22 0343     Amphet/Methamphet, Screen Negative     Barbiturates Screen, Urine Positive     Benzodiazepine Screen, Urine Positive     Cocaine Screen, Urine Negative     Opiate Screen Negative     THC, Screen, Urine Negative     Methadone Screen, Urine Negative     Oxycodone Screen, Urine Negative    Narrative:      Negative  Thresholds Per Drugs Screened:    Amphetamines                 500 ng/ml  Barbiturates                 200 ng/ml  Benzodiazepines              100 ng/ml  Cocaine                      300 ng/ml  Methadone                    300 ng/ml  Opiates                      300 ng/ml  Oxycodone                    100 ng/ml  THC                           50 ng/ml    The Normal Value for all drugs tested is negative. This report includes final unconfirmed screening results to be used for medical treatment purposes only. Unconfirmed results must not be used for non-medical purposes such as employment or legal testing. Clinical consideration should be applied to any drug of abuse test, particularly when unconfirmed results are used.            Urinalysis With Microscopic If Indicated (No Culture) - Urine, Clean Catch [[id number]]  (Normal) Collected: 05/17/22 0229    Specimen: Urine, Clean Catch Updated: 05/17/22 0245     Color, UA Yellow     Appearance, UA Clear     pH, UA 5.5     Specific Gravity, UA 1.015     Glucose, UA Negative     Ketones, UA Negative     Bilirubin, UA Negative     Blood, UA Negative     Protein, UA Negative     Leuk Esterase, UA Negative     Nitrite, UA Negative     Urobilinogen, UA 0.2 E.U./dL    Narrative:      Urine microscopic not indicated.    CBC (No Diff) [[id number]]  (Normal) Collected: 05/17/22 0530    Specimen: Blood Updated: 05/17/22 0556     WBC 6.93 10*3/mm3      RBC 4.19 10*6/mm3      Hemoglobin 13.1 g/dL      Hematocrit 39.2 %      MCV 93.6 fL      MCH 31.3 pg      MCHC 33.4 g/dL      RDW 13.5 %      RDW-SD 45.3 fl      MPV 10.4 fL      Platelets 167 10*3/mm3     Basic Metabolic Panel [[id number]]  (Abnormal) Collected: 05/17/22 0617    Specimen: Blood Updated: 05/17/22 0647     Glucose 92 mg/dL      BUN 4 mg/dL      Creatinine 0.62 mg/dL      Sodium 136 mmol/L      Potassium 3.5 mmol/L      Chloride 103 mmol/L      CO2 21.9 mmol/L      Calcium 7.8 mg/dL      BUN/Creatinine Ratio 6.5     Anion  Gap 11.1 mmol/L      eGFR 130.2 mL/min/1.73      Comment: National Kidney Foundation and American Society of Nephrology (ASN) Task Force recommended calculation based on the Chronic Kidney Disease Epidemiology Collaboration (CKD-EPI) equation refit without adjustment for race.       Narrative:      GFR Normal >60  Chronic Kidney Disease <60  Kidney Failure <15            Imaging Results (Last 24 Hours)     ** No results found for the last 24 hours. **              No orders to display        Assessment/Plan     Active Hospital Problems    Diagnosis  POA   • **Alcohol withdrawal syndrome with complication (HCC) [F10.239]  Yes   • Tobacco dependence due to cigarettes [F17.210]  Yes   • Seizure due to alcohol withdrawal (HCC) [F10.239, R56.9]  Yes   • Hypocalcemia [E83.51]  Yes      Resolved Hospital Problems   No resolved problems to display.       Mr. Mg is a 32 y.o. male smoker with a history of alcohol abuse, previous seizure who is admitted for alcohol withdrawal    -Monitor on telemetry. Most recent CIWA score 10. Follow CIWA protocol. Access consult. Vitamin repletion. IVF's. Antiemetics as needed. Continue librium. Add pepcid for GI prophylaxis  -CXR due to c/o shortness of breath, cough  -Ca 7.8. Check Vit D, Mg  -Previous seizure due to alcohol withdrawal; close monitoring  [facility]  -Refuses need for nicotine patch. Encourage cessation, IS  -Further recommendations based on hospital course      · I discussed the patient's findings and my recommendations with patient.    VTE Prophylaxis - Pharmacy to dose Lovenox.  Code Status - Full code.       SHAYY Moser  [facility]  05/17/22  10:26 EDT      Electronically signed by Cuco Teixeira MD at 05/17/22 1357          Emergency Department Notes      Jessica Eason, RN at 05/16/22 9247        Pt to ED from [facility] with c/o withdrawal.   Per EMS pt was admitted in [place] r/t seizure.  Pt takes ativan QID for his seizure disorder and reports last dose was x 3 days ago.  Pt also admits to daily ETOH, approx 25-30 shots daily.  Pt has tremors present, A&ox4.    Pt wearing mask, staff wearing appropriate PPE.    Electronically signed by Jessica Eason RN at 05/16/22 3720     Jason Salazar III, PA at 05/16/22 7638      Procedure Orders    1. Critical Care [[id number]] ordered by Jason Salazar III, PA          Attestation signed by Aileen Cifuentes MD at 05/17/22 4705    MD Attestation Note    I supervised care provided by the midlevel provider.    The GISEL and I have discussed this patient's history, physical exam, and treatment plan.   I provided a substantive portion of the care of this patient.  I have reviewed the documentation and personally had a face to face interaction with the patient  I affirm the documentation and agree with the treatment and plan.   My personal findings are documented in a separate note.                  EMERGENCY DEPARTMENT ENCOUNTER    Room Number:  01/01  Date of encounter:  5/17/2022  PCP: Provider, No Known  Historian: Patient      HPI:  Chief Complaint: Alcohol withdrawal  A complete HPI/ROS/PMH/PSH/SH/FH are unobtainable due to: Nothing    Context: Davonte Mg is a 32 y.o. male who presents to the ED c/o alcohol withdrawal.  Patient states he has been drinking heavily over the past week or more.  He attempted to sober up and when he woke up this morning he was feeling very anxious, nauseated, vomiting, sweating, and experiencing epigastric pain.  He took 4 shots of hard liquor in an effort to control symptoms.  This temporarily helped until mid afternoon.  His mother evaluated him and found him to be experiencing withdrawal symptoms and recommended he present to the emergency department for further treatment.    Patient was recently seen and admitted for alcohol withdrawal at [facility] in [facility].  He was then discharged and to be admitted to [facility] in [facility] for alcohol abuse.  Between discharge from [facility] and showing up at [facility], he managed to drink againtriggering withdrawal symptoms.  Patient does have a past medical history of seizure disorder and is on Ativan.  He reports he drinks 20-25 shots a day and has a past medical history of alcohol withdrawal.      PAST MEDICAL HISTORY  Active Ambulatory Problems     Diagnosis Date Noted   • No Active Ambulatory Problems     Resolved Ambulatory Problems     Diagnosis Date Noted   • No Resolved Ambulatory Problems     No Additional Past Medical History         PAST SURGICAL HISTORY  Past Surgical History:   Procedure Laterality Date   • KNEE SURGERY Right          FAMILY HISTORY  History reviewed. No pertinent family history.      SOCIAL HISTORY  Social History     Socioeconomic History   • Marital status: Single   Tobacco Use   • Smoking status: Current Every Day Smoker     Packs/day: 0.50     Types: Cigarettes   • Smokeless tobacco: Never Used   Substance and Sexual Activity   • Alcohol use: Yes     Alcohol/week: 210.0 standard drinks     Types: 210 Shots of liquor per week         ALLERGIES  Patient has no known allergies.        REVIEW OF SYSTEMS  Review of Systems   Constitutional: Positive for diaphoresis. Negative for chills and fever.   HENT: Negative.    Eyes: Negative.    Cardiovascular: Positive for palpitations.   Gastrointestinal: Positive for nausea and vomiting.   Genitourinary: Negative.    Musculoskeletal: Negative.    Skin: Negative.    Neurological: Negative.    Psychiatric/Behavioral: Negative.         All systems reviewed and negative except for those discussed in HPI.       PHYSICAL EXAM    I have reviewed the triage vital signs and nursing notes.    ED Triage Vitals   Temp Heart Rate Resp BP SpO2   05/16/22 2146 05/16/22 2146 05/16/22 2146 05/16/22  2202 05/16/22 2146   98.4 °F (36.9 °C) 65 28 118/66 97 %      Temp src Heart Rate Source Patient Position BP Location FiO2 (%)   05/16/22 2146 -- -- -- --   Temporal           Physical Exam  GENERAL: Anxious, diaphoretic, and actively vomiting  HENT: nares patent  EYES: no scleral icterus  CV: regular rhythm, slightly tachycardic   RESPIRATORY: normal effort  ABDOMEN: soft  MUSCULOSKELETAL: no deformity  NEURO: alert to person and date disoriented to place and time, moves all extremities, follows commands, tremorous  SKIN: warm, dry        LAB RESULTS  Recent Results (from the past 24 hour(s))   Comprehensive Metabolic Panel    Collection Time: 05/16/22 10:09 PM    Specimen: Blood   Result Value Ref Range    Glucose 91 65 - 99 mg/dL    BUN 4 (L) 6 - 20 mg/dL    Creatinine 0.63 (L) 0.76 - 1.27 mg/dL    Sodium 141 136 - 145 mmol/L    Potassium 3.5 3.5 - 5.2 mmol/L    Chloride 106 98 - 107 mmol/L    CO2 22.0 22.0 - 29.0 mmol/L    Calcium 7.9 (L) 8.6 - 10.5 mg/dL    Total Protein 6.2 6.0 - 8.5 g/dL    Albumin 4.00 3.50 - 5.20 g/dL    ALT (SGPT) 29 1 - 41 U/L    AST (SGOT) 21 1 - 40 U/L    Alkaline Phosphatase 78 39 - 117 U/L    Total Bilirubin 0.2 0.0 - 1.2 mg/dL    Globulin 2.2 gm/dL    A/G Ratio 1.8 g/dL    BUN/Creatinine Ratio 6.3 (L) 7.0 - 25.0    Anion Gap 13.0 5.0 - 15.0 mmol/L    eGFR 129.6 >60.0 mL/min/1.73   Protime-INR    Collection Time: 05/16/22 10:09 PM    Specimen: Blood   Result Value Ref Range    Protime 13.5 11.7 - 14.2 Seconds    INR 1.04 0.90 - 1.10   Ethanol    Collection Time: 05/16/22 10:09 PM    Specimen: Blood   Result Value Ref Range    Ethanol 133 (H) 0 - 10 mg/dL    Ethanol % 0.133 %   Magnesium    Collection Time: 05/16/22 10:09 PM    Specimen: Blood   Result Value Ref Range    Magnesium 1.9 1.6 - 2.6 mg/dL   CBC Auto Differential    Collection Time: 05/16/22 10:09 PM    Specimen: Blood   Result Value Ref Range    WBC 4.86 3.40 - 10.80 10*3/mm3    RBC 4.55 4.14 - 5.80 10*6/mm3    Hemoglobin  14.7 13.0 - 17.7 g/dL    Hematocrit 43.2 37.5 - 51.0 %    MCV 94.9 79.0 - 97.0 fL    MCH 32.3 26.6 - 33.0 pg    MCHC 34.0 31.5 - 35.7 g/dL    RDW 13.8 12.3 - 15.4 %    RDW-SD 47.9 37.0 - 54.0 fl    MPV 10.1 6.0 - 12.0 fL    Platelets 201 140 - 450 10*3/mm3    Neutrophil % 48.2 42.7 - 76.0 %    Lymphocyte % 40.3 19.6 - 45.3 %    Monocyte % 5.8 5.0 - 12.0 %    Eosinophil % 3.7 0.3 - 6.2 %    Basophil % 1.6 (H) 0.0 - 1.5 %    Immature Grans % 0.4 0.0 - 0.5 %    Neutrophils, Absolute 2.34 1.70 - 7.00 10*3/mm3    Lymphocytes, Absolute 1.96 0.70 - 3.10 10*3/mm3    Monocytes, Absolute 0.28 0.10 - 0.90 10*3/mm3    Eosinophils, Absolute 0.18 0.00 - 0.40 10*3/mm3    Basophils, Absolute 0.08 0.00 - 0.20 10*3/mm3    Immature Grans, Absolute 0.02 0.00 - 0.05 10*3/mm3    nRBC 0.0 0.0 - 0.2 /100 WBC   COVID-19, [facility] IN-HOUSE CEPHEID/MEMO NP SWAB IN TRANSPORT MEDIA 8-12 HR TAT - Swab, Nasopharynx    Collection Time: 05/16/22 10:11 PM    Specimen: Nasopharynx; Swab   Result Value Ref Range    COVID19 Not Detected Not Detected - Ref. Range   Lipase    Collection Time: 05/17/22  1:34 AM    Specimen: Blood   Result Value Ref Range    Lipase 7 (L) 13 - 60 U/L   Urinalysis With Microscopic If Indicated (No Culture) - Urine, Clean Catch    Collection Time: 05/17/22  2:29 AM    Specimen: Urine, Clean Catch   Result Value Ref Range    Color, UA Yellow Yellow, Straw    Appearance, UA Clear Clear    pH, UA 5.5 5.0 - 8.0    Specific Gravity, UA 1.015 1.005 - 1.030    Glucose, UA Negative Negative    Ketones, UA Negative Negative    Bilirubin, UA Negative Negative    Blood, UA Negative Negative    Protein, UA Negative Negative    Leuk Esterase, UA Negative Negative    Nitrite, UA Negative Negative    Urobilinogen, UA 0.2 E.U./dL 0.2 - 1.0 E.U./dL       Ordered the above labs and independently reviewed the results.        RADIOLOGY  No Radiology Exams Resulted Within Past 24 Hours    I ordered the above noted radiological studies. Reviewed by  me and discussed with radiologist.  See dictation for official radiology interpretation.      PROCEDURES    Critical Care  Performed by: Jason Salazar III, PA  Authorized by: Aileen Cifuentes MD     Critical care provider statement:     Critical care time (minutes):  30    Critical care was necessary to treat or prevent imminent or life-threatening deterioration of the following conditions: Alcohol withdrawal.    Critical care was time spent personally by me on the following activities:  Blood draw for specimens, development of treatment plan with patient or surrogate, discussions with consultants, evaluation of patient's response to treatment, examination of patient, obtaining history from patient or surrogate, ordering and performing treatments and interventions, ordering and review of laboratory studies, pulse oximetry and re-evaluation of patient's condition    I assumed direction of critical care for this patient from another provider in my specialty: yes            MEDICATIONS GIVEN IN ER    Medications   sodium chloride 0.9 % flush 10 mL (has no administration in time range)   sodium chloride 0.9 % bolus 1,000 mL (1,000 mL Intravenous New Bag 5/17/22 0230)   thiamine (B-1) 100 mg, folic acid 1 mg in sodium chloride 0.9 % 1,000 mL infusion (0 mL/hr Intravenous Stopped 5/17/22 0111)   LORazepam (ATIVAN) injection 2 mg (2 mg Intravenous Given 5/16/22 2244)   aluminum-magnesium hydroxide-simethicone (MAALOX MAX) 400-400-40 MG/5ML suspension 15 mL (15 mL Oral Given 5/16/22 2247)   Lidocaine Viscous HCl (XYLOCAINE) 2 % solution 15 mL (15 mL Mouth/Throat Given 5/16/22 2246)   ondansetron (ZOFRAN) injection 4 mg (4 mg Intravenous Given 5/16/22 2248)   LORazepam (ATIVAN) injection 2 mg (2 mg Intravenous Given 5/17/22 0134)         PROGRESS, DATA ANALYSIS, CONSULTS, AND MEDICAL DECISION MAKING    All labs have been independently reviewed by me.  All radiology studies have been reviewed by me and discussed with  radiologist dictating the report.   EKG's independently viewed and interpreted by me.  Discussion below represents my analysis of pertinent findings related to patient's condition, differential diagnosis, treatment plan and final disposition.    DDx includes but not limited to: Polysubstance abuse, alcohol withdrawal    ED Course as of 05/17/22 0250   Tue May 17, 2022   0118 WBC: 4.86 [RC]   0118 Hemoglobin: 14.7 [RC]   0118 Hematocrit: 43.2 [RC]   0118 Platelets: 201 [RC]   0118 Glucose: 91 [RC]   0118 BUN(!): 4 [RC]   0118 Creatinine(!): 0.63 [RC]   0118 Sodium: 141 [RC]   0118 Potassium: 3.5 [RC]   0118 CO2: 22.0 [RC]   0118 Anion Gap: 13.0 [RC]   0119 ALT (SGPT): 29 [RC]   0119 AST (SGOT): 21 [RC]   0119 Alkaline Phosphatase: 78 [RC]   0119 Ethanol(!): 133 [RC]   0119 Magnesium: 1.9 [RC]   0119 COVID19: Not Detected [RC]   0119 INR: 1.04 [RC]   0239 Working diagnosis is alcohol withdrawal.  Discussed the patient's case with SHAYY Best with [facility].  To admit to a telemetry bed under Dr. Montiel's care. [RC]      ED Course User Index  [RC] Jason Salazar III, PA           PPE: The patient wore a surgical mask throughout the entire patient encounter. I wore an N95.    AS OF 02:50 EDT VITALS:    BP - 135/93  HR - 77  TEMP - 98.4 °F (36.9 °C) (Temporal)  O2 SATS - 97%        DIAGNOSIS  Final diagnoses:   Alcohol withdrawal syndrome with complication (HCC)         DISPOSITION  ADMISSION    Discussed treatment plan and reason for admission with pt/family and admitting physician.  Pt/family voiced understanding of the plan for admission for further testing/treatment as needed.              Jason Salazar III, PA  05/17/22 0250      Electronically signed by Aileen Cifuentes MD at 05/17/22 0335     Aileen Cifuentes MD at 05/17/22 0042        The GISEL and I have discussed this patient's history, physical exam and treatment plan.  I provided a substantive portion of the care of this patient.  I have  reviewed the documentation and personally had a face to face interaction with the patient and personally performed the physical exam, in its entirety.  I affirm the documentation and agree with the treatment and plan.  The following describes my personal findings.      The patient presents complaining of nausea, vomiting, epigastric pain, anxiety worsening since this morning.  Patient reports he would like to quit drinking, relapsed 1 week ago, drinking daily since then until approximately 24 hours ago.  Patient reports he hears music playing very loudly in his ears at times that he knows is not actually playing.    Comprehensive Physical exam:  Patient is nontoxic appearing mildly anxious, tremulous, oriented awake, alert  HEENT: normocephalic, atraumatic  Neck: No JVD no goiter, no pain with ROM  Pulmonary: Nontachypneic, breath sounds heard well bilaterally  cardiovascular: Heart rate in the 70s, regular  Abdomen: Soft, nontender  musculoskeletal: Good range of motion, pulse, sensation x4  Neuro/psychiatric:calm, appropriate, cooperative, Achilles and patellar reflexes 2+ equal bilaterally, no clonus  Skin:warm, dry    CIWA 10 at the time of exam  Anticipate admission for alcohol withdrawal given severity of patient's symptoms,    Patient was wearing facemask when I entered the room and throughout our encounter. Full protective equipment was worn throughout this patient encounter including a face mask, eye protection and gloves. Hand hygiene was performed before donning protective equipment and after removal when leaving the room.           Aileen Cifuentes MD  05/17/22 0335      Electronically signed by Aileen Cifuentes MD at 05/17/22 0335     Johanna Hair RN at 05/17/22 0251        Spoke with Sharon at the [facility] ([facility]) in regards to pt's current status. This RN was informed that staff from [facility] will pick pt up once he is to be discharged. Direct # is [phone].    Electronically signed by  Johanna Hair, RN at 05/17/22 0253     Marcia Steiner, RN at 05/17/22 1259        Admitting physician, Dr. Teixeira, aware of last several hours of high CIWA scores. MD still wants patient to go to assigned room on Telemetry 6E.    Electronically signed by Marcia Steiner, RN at 05/17/22 1302       Vital Signs (last 3 days) before discharge     Date/Time Temp Temp src Pulse Resp BP Patient Position SpO2    05/18/22 1322 98 (36.7) Oral 70 18 137/100 Sitting 97    05/18/22 0750 98.7 (37.1) Oral 58 18 111/71 Lying 95    05/17/22 2100 -- -- -- 18 129/87 Sitting --    05/17/22 1431 98.1 (36.7) Oral 66 18 131/96 Sitting 93    05/17/22 1336 -- -- 74 -- -- -- 94    05/17/22 1335 -- -- 92 -- 127/91 -- --    05/17/22 1234 -- -- 65 -- -- -- 95    05/17/22 1103 -- -- 75 -- 124/77 -- 95    05/17/22 0859 -- -- 66 -- 121/71 -- 96    05/17/22 0800 -- -- 66 -- 115/67 -- 98    05/17/22 0730 -- -- 79 -- 120/72 -- 97    05/17/22 0701 -- -- 64 -- 117/70 -- --    05/17/22 0501 -- -- 63 -- 131/79 -- 95    05/17/22 0431 -- -- 65 -- 122/75 -- 94    05/17/22 0401 -- -- 69 -- 143/94 -- 94    05/17/22 0301 -- -- 67 -- 137/94 -- 96    05/17/22 0249 -- -- 77 -- -- -- 97    05/17/22 0231 -- -- 79 -- 135/93 -- 96    05/17/22 0218 -- -- 73 -- -- -- 96    05/17/22 0201 -- -- 75 -- 117/76 -- 96    05/17/22 0123 -- -- 92 -- -- -- --    05/17/22 0105 -- -- 80 -- -- -- 92    05/17/22 0101 -- -- -- -- 119/83 -- --    05/17/22 0033 -- -- 72 -- -- -- 95    05/17/22 0032 -- -- 73 -- -- -- 92    05/17/22 0031 -- -- -- -- 115/74 -- --    05/17/22 0001 -- -- 79 -- 119/68 -- 92    05/16/22 2343 -- -- 77 -- 116/66 -- 92    05/16/22 2326 -- -- 72 -- -- -- 93    05/16/22 2259 -- -- 74 -- -- -- 95    05/16/22 22:02:19 -- -- -- -- 118/66 -- 94    05/16/22 2201 -- -- 72 -- -- -- 94    05/16/22 2146 98.4 (36.9) Temporal 65 28 -- -- 97          Oxygen Therapy (last 3 days) before discharge     Date/Time SpO2 Device (Oxygen Therapy) Flow (L/min) Oxygen  Concentration (%) ETCO2 (mmHg)    05/18/22 1322 97 room air -- -- --    05/18/22 0808 -- room air -- -- --    05/18/22 0750 95 room air -- -- --    05/17/22 2000 -- nasal cannula -- -- --    05/17/22 1500 -- nasal cannula 2 -- --    05/17/22 1431 93 nasal cannula -- -- --    05/17/22 1336 94 -- -- -- --    05/17/22 1234 95 -- -- -- --    05/17/22 1103 95 -- -- -- --    05/17/22 0859 96 -- -- -- --    05/17/22 0800 98 -- -- -- --    05/17/22 0730 97 -- -- -- --    05/17/22 0501 95 -- -- -- --    05/17/22 0431 94 -- -- -- --    05/17/22 0401 94 -- -- -- --    05/17/22 0301 96 -- -- -- --    05/17/22 0249 97 -- -- -- --    05/17/22 0231 96 -- -- -- --    05/17/22 0218 96 -- -- -- --    05/17/22 0201 96 -- -- -- --    05/17/22 01:23:12 -- nasal cannula 2 -- --    05/17/22 0105 92 -- -- -- --    05/17/22 0033 95 -- -- -- --    05/17/22 0032 92 -- -- -- --    05/17/22 0001 92 -- -- -- --    05/16/22 2343 92 -- -- -- --    05/16/22 2326 93 -- -- -- --    05/16/22 2259 95 -- -- -- --    05/16/22 22:02:19 94 room air -- -- --    05/16/22 2201 94 -- -- -- --    05/16/22 2146 97 nasal cannula 2 -- --        Intake & Output (last 3 days)     None         Lines, Drains & Airways     Active LDAs     None          Inactive LDAs     Name Placement date Placement time Removal date Removal time Site Days    [REMOVED] Peripheral IV 05/16/22 2146 Anterior;Left;Upper Arm 05/16/22  2146  05/18/22  1300  Arm  1    [REMOVED] Peripheral IV 05/18/22 Right Hand 05/18/22  --  05/18/22  1631  Hand  less than 1    [REMOVED] Peripheral IV 05/18/22 1533 Anterior;Distal;Right;Upper Arm 05/18/22  1533  05/18/22  1631  Arm  less than 1                CIWA (last 7 days)     Date/Time CIWA-Ar Score    05/18/22 1532 16    05/18/22 1426 11    05/18/22 1000 11    05/18/22 0808 11    05/18/22 0300 7    05/18/22 0200 12    05/18/22 0100 8    05/18/22 0000 8    05/17/22 2300 12    05/17/22 2200 11    05/17/22 2100 13    05/17/22 2000 7    05/17/22 1745 11     05/17/22 1714 11    05/17/22 1500 11          Facility-Administered Medications as of 5/18/2022   Medication Dose Route Frequency Provider Last Rate Last Admin   • [COMPLETED] aluminum-magnesium hydroxide-simethicone (MAALOX MAX) 400-400-40 MG/5ML suspension 15 mL  15 mL Oral Once Jason Salazar III, PA   15 mL at 05/16/22 2247   • [COMPLETED] Lidocaine Viscous HCl (XYLOCAINE) 2 % solution 10 mL  10 mL Mouth/Throat Once Cuco Teixeira MD   10 mL at 05/18/22 1215   • [COMPLETED] Lidocaine Viscous HCl (XYLOCAINE) 2 % solution 15 mL  15 mL Mouth/Throat Once Jason Salazar III, PA   15 mL at 05/16/22 2246   • [COMPLETED] LORazepam (ATIVAN) injection 2 mg  2 mg Intravenous Once Aileen Cifuentes MD   2 mg at 05/16/22 2244   • [COMPLETED] LORazepam (ATIVAN) injection 2 mg  2 mg Intravenous Once Aileen Cifuentes MD   2 mg at 05/17/22 0134   • [COMPLETED] ondansetron (ZOFRAN) injection 4 mg  4 mg Intravenous Once Jason Salazar III, PA   4 mg at 05/16/22 2248   • [COMPLETED] sodium chloride 0.9 % bolus 1,000 mL  1,000 mL Intravenous Once Jason Salazar III, PA   Stopped at 05/17/22 0333   • [COMPLETED] thiamine (B-1) 100 mg, folic acid 1 mg in sodium chloride 0.9 % 1,000 mL infusion  1,000 mL/hr Intravenous Once Ross Ross PA   Stopped at 05/17/22 0111   • [COMPLETED] traZODone (DESYREL) tablet 50 mg  50 mg Oral Once Roberto Carlos Pate APRN   50 mg at 05/18/22 0200     Lab Results (last 72 hours)     Procedure Component Value Units Date/Time    Procalcitonin [[id number]]  (Normal) Collected: 05/18/22 0855    Specimen: Blood Updated: 05/18/22 1454     Procalcitonin 0.09 ng/mL     Narrative:      As a Marker for Sepsis (Non-Neonates):    1. <0.5 ng/mL represents a low risk of severe sepsis and/or septic shock.  2. >2 ng/mL represents a high risk of severe sepsis and/or septic shock.    As a Marker for Lower Respiratory Tract Infections that require antibiotic therapy:    PCT on  "Admission    Antibiotic Therapy       6-12 Hrs later    >0.5                Strongly Recommended  >0.25 - <0.5        Recommended   0.1 - 0.25          Discouraged              Remeasure/reassess PCT  <0.1                Strongly Discouraged     Remeasure/reassess PCT    As 28 day mortality risk marker: \"Change in Procalcitonin Result\" (>80% or <=80%) if Day 0 (or Day 1) and Day 4 values are available. Refer to [email or web address]    Change in PCT <=80%  A decrease of PCT levels below or equal to 80% defines a positive change in PCT test result representing a higher risk for 28-day all-cause mortality of patients diagnosed with severe sepsis for septic shock.    Change in PCT >80%  A decrease of PCT levels of more than 80% defines a negative change in PCT result representing a lower risk for 28-day all-cause mortality of patients diagnosed with severe sepsis or septic shock.       Comprehensive Metabolic Panel [[id number]]  (Abnormal) Collected: 05/18/22 0855    Specimen: Blood Updated: 05/18/22 0945     Glucose 126 mg/dL      BUN 3 mg/dL      Creatinine 0.77 mg/dL      Sodium 136 mmol/L      Potassium 3.5 mmol/L      Chloride 104 mmol/L      CO2 23.0 mmol/L      Calcium 8.4 mg/dL      Total Protein 5.6 g/dL      Albumin 3.80 g/dL      ALT (SGPT) 22 U/L      AST (SGOT) 14 U/L      Alkaline Phosphatase 76 U/L      Total Bilirubin 0.4 mg/dL      Globulin 1.8 gm/dL      A/G Ratio 2.1 g/dL      BUN/Creatinine Ratio 3.9     Anion Gap 9.0 mmol/L      eGFR 122.0 mL/min/1.73      Comment: National Kidney Foundation and American Society of Nephrology (ASN) Task Force recommended calculation based on the Chronic Kidney Disease Epidemiology Collaboration (CKD-EPI) equation refit without adjustment for race.       Narrative:      GFR Normal >60  Chronic Kidney Disease <60  Kidney Failure <15      CBC (No Diff) [[id number]]  (Normal) Collected: 05/18/22 0855    Specimen: Blood Updated: 05/18/22 0925     WBC 4.18 " 10*3/mm3      RBC 4.52 10*6/mm3      Hemoglobin 14.2 g/dL      Hematocrit 42.2 %      MCV 93.4 fL      MCH 31.4 pg      MCHC 33.6 g/dL      RDW 13.6 %      RDW-SD 45.3 fl      MPV 10.6 fL      Platelets 156 10*3/mm3     Vitamin D 25 Hydroxy [[id number]]  (Abnormal) Collected: 05/17/22 1045    Specimen: Blood Updated: 05/17/22 1136     25 Hydroxy, Vitamin D 19.7 ng/ml     Narrative:      Reference Range for Total Vitamin D 25(OH)     Deficiency <20.0 ng/mL   Insufficiency 21-29 ng/mL   Sufficiency  ng/mL  Toxicity >100 ng/ml    Results may be falsely increased if patient taking Biotin.      Magnesium [[id number]]  (Normal) Collected: 05/17/22 0617    Specimen: Blood Updated: 05/17/22 1045     Magnesium 1.7 mg/dL     Basic Metabolic Panel [[id number]]  (Abnormal) Collected: 05/17/22 0617    Specimen: Blood Updated: 05/17/22 0647     Glucose 92 mg/dL      BUN 4 mg/dL      Creatinine 0.62 mg/dL      Sodium 136 mmol/L      Potassium 3.5 mmol/L      Chloride 103 mmol/L      CO2 21.9 mmol/L      Calcium 7.8 mg/dL      BUN/Creatinine Ratio 6.5     Anion Gap 11.1 mmol/L      eGFR 130.2 mL/min/1.73      Comment: National Kidney Foundation and American Society of Nephrology (ASN) Task Force recommended calculation based on the Chronic Kidney Disease Epidemiology Collaboration (CKD-EPI) equation refit without adjustment for race.       Narrative:      GFR Normal >60  Chronic Kidney Disease <60  Kidney Failure <15      CBC (No Diff) [[id number]]  (Normal) Collected: 05/17/22 0530    Specimen: Blood Updated: 05/17/22 0556     WBC 6.93 10*3/mm3      RBC 4.19 10*6/mm3      Hemoglobin 13.1 g/dL      Hematocrit 39.2 %      MCV 93.6 fL      MCH 31.3 pg      MCHC 33.4 g/dL      RDW 13.5 %      RDW-SD 45.3 fl      MPV 10.4 fL      Platelets 167 10*3/mm3     Urine Drug Screen - Urine, Clean Catch [[id number]]  (Abnormal) Collected: 05/17/22 0229    Specimen: Urine, Clean Catch Updated: 05/17/22 0343     Amphet/Methamphet, Screen  Negative     Barbiturates Screen, Urine Positive     Benzodiazepine Screen, Urine Positive     Cocaine Screen, Urine Negative     Opiate Screen Negative     THC, Screen, Urine Negative     Methadone Screen, Urine Negative     Oxycodone Screen, Urine Negative    Narrative:      Negative Thresholds Per Drugs Screened:    Amphetamines                 500 ng/ml  Barbiturates                 200 ng/ml  Benzodiazepines              100 ng/ml  Cocaine                      300 ng/ml  Methadone                    300 ng/ml  Opiates                      300 ng/ml  Oxycodone                    100 ng/ml  THC                           50 ng/ml    The Normal Value for all drugs tested is negative. This report includes final unconfirmed screening results to be used for medical treatment purposes only. Unconfirmed results must not be used for non-medical purposes such as employment or legal testing. Clinical consideration should be applied to any drug of abuse test, particularly when unconfirmed results are used.            Urinalysis With Microscopic If Indicated (No Culture) - Urine, Clean Catch [[id number]]  (Normal) Collected: 05/17/22 0229    Specimen: Urine, Clean Catch Updated: 05/17/22 0245     Color, UA Yellow     Appearance, UA Clear     pH, UA 5.5     Specific Gravity, UA 1.015     Glucose, UA Negative     Ketones, UA Negative     Bilirubin, UA Negative     Blood, UA Negative     Protein, UA Negative     Leuk Esterase, UA Negative     Nitrite, UA Negative     Urobilinogen, UA 0.2 E.U./dL    Narrative:      Urine microscopic not indicated.    Lipase [[id number]]  (Abnormal) Collected: 05/17/22 0134    Specimen: Blood Updated: 05/17/22 0151     Lipase 7 U/L     COVID PRE-OP / PRE-PROCEDURE SCREENING ORDER (NO ISOLATION) - Swab, Nasopharynx [[id number]]  (Normal) Collected: 05/16/22 2211    Specimen: Swab from Nasopharynx Updated: 05/16/22 2317    Narrative:      The following orders were created for panel order COVID PRE-OP  / PRE-PROCEDURE SCREENING ORDER (NO ISOLATION) - Swab, Nasopharynx.  Procedure                               Abnormality         Status                     ---------                               -----------         ------                     COVID-19,[facility]-HOUSE...[[id number]]  Normal              Final result                 Please view results for these tests on the individual orders.    COVID-19,[facility] CEPHEID/MEMO NP SWAB IN TRANSPORT MEDIA 8-12 HR TAT - Swab, Nasopharynx [[id number]]  (Normal) Collected: 05/16/22 2211    Specimen: Swab from Nasopharynx Updated: 05/16/22 2317     COVID19 Not Detected    Narrative:      Fact sheet for providers: https://www.fda.gov/media/784174/download     Fact sheet for patients: https://www.fda.gov/media/096352/download    Protime-INR [[id number]]  (Normal) Collected: 05/16/22 2209    Specimen: Blood Updated: 05/16/22 2254     Protime 13.5 Seconds      INR 1.04    Comprehensive Metabolic Panel [[id number]]  (Abnormal) Collected: 05/16/22 2209    Specimen: Blood Updated: 05/16/22 2247     Glucose 91 mg/dL      BUN 4 mg/dL      Creatinine 0.63 mg/dL      Sodium 141 mmol/L      Potassium 3.5 mmol/L      Chloride 106 mmol/L      CO2 22.0 mmol/L      Calcium 7.9 mg/dL      Total Protein 6.2 g/dL      Albumin 4.00 g/dL      ALT (SGPT) 29 U/L      AST (SGOT) 21 U/L      Alkaline Phosphatase 78 U/L      Total Bilirubin 0.2 mg/dL      Globulin 2.2 gm/dL      A/G Ratio 1.8 g/dL      BUN/Creatinine Ratio 6.3     Anion Gap 13.0 mmol/L      eGFR 129.6 mL/min/1.73      Comment: National Kidney Foundation and American Society of Nephrology (ASN) Task Force recommended calculation based on the Chronic Kidney Disease Epidemiology Collaboration (CKD-EPI) equation refit without adjustment for race.       Narrative:      GFR Normal >60  Chronic Kidney Disease <60  Kidney Failure <15      Ethanol [[id number]]  (Abnormal) Collected: 05/16/22 2209    Specimen: Blood Updated: 05/16/22 2247      Ethanol 133 mg/dL      Ethanol % 0.133 %     Magnesium [[id number]]  (Normal) Collected: 05/16/22 2209    Specimen: Blood Updated: 05/16/22 2247     Magnesium 1.9 mg/dL     CBC & Differential [[id number]]  (Abnormal) Collected: 05/16/22 2209    Specimen: Blood Updated: 05/16/22 2233    Narrative:      The following orders were created for panel order CBC & Differential.  Procedure                               Abnormality         Status                     ---------                               -----------         ------                     CBC Auto Differential[[id number]]        Abnormal            Final result                 Please view results for these tests on the individual orders.    CBC Auto Differential [[id number]]  (Abnormal) Collected: 05/16/22 2209    Specimen: Blood Updated: 05/16/22 2233     WBC 4.86 10*3/mm3      RBC 4.55 10*6/mm3      Hemoglobin 14.7 g/dL      Hematocrit 43.2 %      MCV 94.9 fL      MCH 32.3 pg      MCHC 34.0 g/dL      RDW 13.8 %      RDW-SD 47.9 fl      MPV 10.1 fL      Platelets 201 10*3/mm3      Neutrophil % 48.2 %      Lymphocyte % 40.3 %      Monocyte % 5.8 %      Eosinophil % 3.7 %      Basophil % 1.6 %      Immature Grans % 0.4 %      Neutrophils, Absolute 2.34 10*3/mm3      Lymphocytes, Absolute 1.96 10*3/mm3      Monocytes, Absolute 0.28 10*3/mm3      Eosinophils, Absolute 0.18 10*3/mm3      Basophils, Absolute 0.08 10*3/mm3      Immature Grans, Absolute 0.02 10*3/mm3      nRBC 0.0 /100 WBC           Imaging Results (Last 72 Hours)     Procedure Component Value Units Date/Time    XR Chest 1 View [[id number]] Collected: 05/17/22 1059     Updated: 05/17/22 1105    Narrative:      CHEST SINGLE VIEW     HISTORY: Shortness of breath.     COMPARISON: None.     FINDINGS: Heart and mediastinal structures are within normal limits.  There is mild increased opacity in the peripheral aspect of the left  lung base potentially due to mild infiltrate. There are no previous  studies for  "comparison. There is no perihilar edema and the lungs are  otherwise clear. There is no evidence for pleural effusion.       Impression:      Subtle increased, hazy opacity in the peripheral aspect of  the left lung base, potentially due to a small infiltrate in the proper  clinical setting. No further evidence for active disease in the chest.     This report was finalized on 5/17/2022 11:02 AM by Dr. Julio Blood M.D.           Orders (all)      Start     Ordered    05/18/22 2100  melatonin tablet 5 mg  Nightly,   Status:  Discontinued         05/18/22 1427    05/18/22 1730  pantoprazole (PROTONIX) EC tablet 40 mg  2 Times Daily Before Meals,   Status:  Discontinued         05/18/22 1427    05/18/22 1425  Procalcitonin  Once         05/18/22 1424    05/18/22 1424  [facility]patient [facility] Consult  Once        Provider:  (Not yet assigned)    05/18/22 1423    05/18/22 1215  cholecalciferol (VITAMIN D3) tablet 1,000 Units  Daily,   Status:  Discontinued         05/18/22 1118    05/18/22 1045  Lidocaine Viscous HCl (XYLOCAINE) 2 % solution 10 mL  Once         05/18/22 0951    05/18/22 0951  aluminum-magnesium hydroxide-simethicone (MAALOX MAX) 400-400-40 MG/5ML suspension 15 mL  Every 6 Hours PRN,   Status:  Discontinued         05/18/22 0951    05/18/22 0900  thiamine (VITAMIN B-1) tablet 100 mg  Daily,   Status:  Discontinued        \"And\" Linked Group Details    05/17/22 0355    05/18/22 0900  multivitamin (THERAGRAN) tablet 1 tablet  Daily,   Status:  Discontinued        \"And\" Linked Group Details    05/17/22 0355    05/18/22 0900  folic acid (FOLVITE) tablet 1 mg  Daily,   Status:  Discontinued        \"And\" Linked Group Details    05/17/22 0355    05/18/22 0600  CBC (No Diff)  Morning Draw         05/17/22 1030    05/18/22 0600  Comprehensive Metabolic Panel  Morning Draw         05/17/22 1030    05/18/22 0145  traZODone (DESYREL) tablet 50 mg  Once         05/18/22 0052    05/17/22 2100  Enoxaparin Sodium " "(LOVENOX) syringe 40 mg  Nightly,   Status:  Discontinued         05/17/22 1037    05/17/22 1400  Incentive Spirometry  Every 4 Hours While Awake,   Status:  Canceled       05/17/22 1029    05/17/22 1031  Magnesium  Once         05/17/22 1030    05/17/22 1031  Vitamin D 25 Hydroxy  Once         05/17/22 1030    05/17/22 1030  Pharmacy to Dose enoxaparin (LOVENOX)  Continuous PRN,   Status:  Discontinued         05/17/22 1030    05/17/22 1022  XR Chest 1 View  1 Time Imaging         05/17/22 1021    05/17/22 0911  famotidine (PEPCID) injection 20 mg  Every 12 Hours Scheduled,   Status:  Discontinued         05/17/22 0909    05/17/22 0900  sodium chloride 0.9 % flush 10 mL  Every 12 Hours Scheduled,   Status:  Discontinued         05/17/22 0354    05/17/22 0600  Basic Metabolic Panel  Morning Draw         05/17/22 0354    05/17/22 0600  CBC (No Diff)  Morning Draw         05/17/22 0354    05/17/22 0600  chlordiazePOXIDE (LIBRIUM) capsule 25 mg  Every 8 Hours Scheduled,   Status:  Discontinued         05/17/22 0355    05/17/22 0400  Vital Signs  Every 4 Hours,   Status:  Canceled       05/17/22 0354    05/17/22 0356  sodium chloride 0.9 % infusion  Continuous,   Status:  Discontinued         05/17/22 0354    05/17/22 0355  LORazepam (ATIVAN) tablet 1 mg  Every 2 Hours PRN,   Status:  Discontinued        \"Or\" Linked Group Details    05/17/22 0355    05/17/22 0355  LORazepam (ATIVAN) injection 1 mg  Every 2 Hours PRN,   Status:  Discontinued        \"Or\" Linked Group Details    05/17/22 0355    05/17/22 0355  LORazepam (ATIVAN) tablet 2 mg  Every 1 Hour PRN,   Status:  Discontinued        \"Or\" Linked Group Details    05/17/22 0355    05/17/22 0355  LORazepam (ATIVAN) injection 2 mg  Every 1 Hour PRN,   Status:  Discontinued        \"Or\" Linked Group Details    05/17/22 0355    05/17/22 0355  LORazepam (ATIVAN) injection 2 mg  Every 15 Minutes PRN,   Status:  Discontinued        \"Or\" Linked Group Details    05/17/22 0355    " "05/17/22 0355  LORazepam (ATIVAN) injection 2 mg  Every 15 Minutes PRN,   Status:  Discontinued        \"Or\" Linked Group Details    05/17/22 0355 05/17/22 0355  Initiate Alcohol Withdrawal Protocol  Until Discontinued         05/17/22 0355 05/17/22 0355  Vital Signs  Per Hospital Policy         05/17/22 0355 05/17/22 0355  Continuous Pulse Oximetry  Continuous         05/17/22 0355 05/17/22 0355  Obtain Baseline Clinical Huntington Withdrawal Assessment - Ar, Sedation Scale & Vital Signs  Once,   Status:  Canceled        Comments: Follow CIWA Scoring Reference Guide    05/17/22 0355 05/17/22 0355  Clinical [place] Withdrawal Assessment (CIWA-Ar)  Per Hospital Policy,   Status:  Canceled         05/17/22 0355 05/17/22 0355  If CIWA Score Less Than 8 Monitor Every 4 Hours & Then Discontinue Assessment - Restart Scoring Assessment & Protocol If Symptoms Reappear  Continuous,   Status:  Canceled         05/17/22 0355 05/17/22 0355  Obtain Pre & Post Sedation Scores With Every Lorazepam Dose - Hold For POSS Greater Than 2 or RASS of -3 or Less  Continuous,   Status:  Canceled         05/17/22 0355 05/17/22 0355  Seizure Precautions  Continuous,   Status:  Canceled         05/17/22 0355 05/17/22 0355  Notify Provider - Withdrawal  Until Discontinued,   Status:  Canceled         05/17/22 0355 05/17/22 0355  Notify Provider of Abnormal Lab Results  Until Discontinued,   Status:  Canceled         05/17/22 0355 05/17/22 0355  Notify Provider - Vitals  Until Discontinued,   Status:  Canceled         05/17/22 0355 05/17/22 0355  Safety Precautions  Continuous,   Status:  Canceled         05/17/22 0355 05/17/22 0353  calcium carbonate (TUMS) chewable tablet 500 mg (200 mg elemental)  2 Times Daily PRN,   Status:  Discontinued         05/17/22 0354 05/17/22 0353  ondansetron (ZOFRAN) tablet 4 mg  Every 6 Hours PRN,   Status:  Discontinued        \"Or\" Linked Group Details    05/17/22 0354 " "   05/17/22 0353  ondansetron (ZOFRAN) injection 4 mg  Every 6 Hours PRN,   Status:  Discontinued        \"Or\" Linked Group Details    05/17/22 0354 05/17/22 0353  acetaminophen (TYLENOL) tablet 650 mg  Every 4 Hours PRN,   Status:  Discontinued        \"Or\" Linked Group Details    05/17/22 0354 05/17/22 0353  acetaminophen (TYLENOL) 160 MG/5ML solution 650 mg  Every 4 Hours PRN,   Status:  Discontinued        \"Or\" Linked Group Details    05/17/22 0354 05/17/22 0353  acetaminophen (TYLENOL) suppository 650 mg  Every 4 Hours PRN,   Status:  Discontinued        \"Or\" Linked Group Details    05/17/22 0354 05/17/22 0353  Intake & Output  Every Shift,   Status:  Canceled       05/17/22 0354 05/17/22 0353  Weigh Patient  Once,   Status:  Canceled         05/17/22 0354 05/17/22 0353  Oxygen Therapy- Nasal Cannula; Titrate for SPO2: 90% - 95%  Continuous,   Status:  Canceled         05/17/22 0354 05/17/22 0353  Insert Peripheral IV  Once,   Status:  Canceled         05/17/22 0354 05/17/22 0353  Saline Lock & Maintain IV Access  Continuous,   Status:  Canceled         05/17/22 0354 05/17/22 0353  Code Status and Medical Interventions:  Continuous,   Status:  Canceled         05/17/22 0354 05/17/22 0353  Place Sequential Compression Device  Once,   Status:  Canceled         05/17/22 0354 05/17/22 0353  Maintain Sequential Compression Device  Continuous,   Status:  Canceled         05/17/22 0354 05/17/22 0353  Telemetry - Maintain IV Access  Continuous         05/17/22 0354 05/17/22 0353  Continuous Cardiac Monitoring  Continuous,   Status:  Canceled         05/17/22 0354 05/17/22 0353  May Be Off Telemetry for Tests  Continuous         05/17/22 0354 05/17/22 0353  ACLS Protocol For Life Threatening Dysrhythmias (Unless Code Status Indicates Otherwise)  Continuous         05/17/22 0354 05/17/22 0353  Notify Provider if ACLS Protocol Activated  Until Discontinued         05/17/22 " 0354    05/17/22 0353  Diet Regular  Diet Effective Now,   Status:  Canceled         05/17/22 0354    05/17/22 0352  Telemetry - Pulse Oximetry  Continuous PRN,   Status:  Canceled      Comments: If Patient Develops Unresponsiveness, Acute Dyspnea, Cyanosis or Suspected Hypoxemia Start Continuous Pulse Ox Monitoring, Apply Oxygen & Notify Provider    05/17/22 0354 05/17/22 0352  Oxygen Therapy- Nasal Cannula; Titrate for SPO2: 90% - 95%  Continuous PRN,   Status:  Canceled      Comments: If Patient Develops Unresponsiveness, Acute Dyspnea, Cyanosis or Suspected Hypoxemia Start Continuous Pulse Ox Monitoring, Apply Oxygen & Notify Provider    05/17/22 0354 05/17/22 0352  nitroglycerin (NITROSTAT) SL tablet 0.4 mg  Every 5 Minutes PRN,   Status:  Discontinued         05/17/22 0354 05/17/22 0352  ECG 12 Lead  As Needed,   Status:  Canceled      Comments: Nurse to Release if Patient Expericences Acute Chest Pain or Dysrhythmias    05/17/22 0354 05/17/22 0352  Potassium  As Needed,   Status:  Canceled      Comments: For Ventricular Arrhythmias      05/17/22 0354 05/17/22 0352  Magnesium  As Needed,   Status:  Canceled      Comments: For Ventricular Arrhythmias      05/17/22 0354    05/17/22 0352  Troponin  As Needed,   Status:  Canceled      Comments: For Chest Pain      05/17/22 0354 05/17/22 0352  Blood Gas, Arterial -  As Needed,   Status:  Canceled      Comments: Per O2 PolicyNotify Physician      05/17/22 0354    05/17/22 0352  sodium chloride 0.9 % flush 10 mL  As Needed,   Status:  Discontinued         05/17/22 0354 05/17/22 0250  Critical Care  Once        Comments: This order was created via procedure documentation    05/17/22 0249    05/17/22 0249  Inpatient Admission  Once         05/17/22 0249    05/17/22 0122  sodium chloride 0.9 % bolus 1,000 mL  Once         05/17/22 0120    05/17/22 0121  Urinalysis With Microscopic If Indicated (No Culture) - Urine, Clean Catch  Once         05/17/22  "0120    05/17/22 0117  LORazepam (ATIVAN) injection 2 mg  Once         05/17/22 0115    05/17/22 0113  LORazepam (ATIVAN) injection 1 mg  Once,   Status:  Discontinued         05/17/22 0111    05/17/22 0113  Lipase  STAT         05/17/22 0112 05/16/22 2246  ondansetron (ZOFRAN) injection 4 mg  Once         05/16/22 2244 05/16/22 2240  LORazepam (ATIVAN) injection 2 mg  Once         05/16/22 2238 05/16/22 2240  ondansetron (ZOFRAN) injection 4 mg  Once,   Status:  Discontinued         05/16/22 2238 05/16/22 2240  aluminum-magnesium hydroxide-simethicone (MAALOX MAX) 400-400-40 MG/5ML suspension 15 mL  Once         05/16/22 2238 05/16/22 2240  Lidocaine Viscous HCl (XYLOCAINE) 2 % solution 15 mL  Once         05/16/22 2238 05/16/22 2207  thiamine (B-1) 100 mg, folic acid 1 mg in sodium chloride 0.9 % 1,000 mL infusion  Once         05/16/22 2205 05/16/22 2206  Magnesium  Once         05/16/22 2205 05/16/22 2206  Cardiac Monitoring  Continuous,   Status:  Canceled         05/16/22 2205 05/16/22 2206  Pulse Oximetry, Continuous  Continuous,   Status:  Canceled         05/16/22 2205 05/16/22 2206  Monitor Blood Pressure  Per Hospital Policy         05/16/22 2205 05/16/22 2206  Seizure Precautions  Continuous,   Status:  Canceled         05/16/22 2205 05/16/22 2206  Insert peripheral IV  Once,   Status:  Canceled        \"And\" Linked Group Details    05/16/22 2205 05/16/22 2205  sodium chloride 0.9 % flush 10 mL  As Needed,   Status:  Discontinued        \"And\" Linked Group Details    05/16/22 2205 05/16/22 2205  COVID PRE-OP / PRE-PROCEDURE SCREENING ORDER (NO ISOLATION) - Swab, Nasopharynx  Once         05/16/22 2205 05/16/22 2205  CBC & Differential  Once         05/16/22 2205    05/16/22 2205  Comprehensive Metabolic Panel  Once         05/16/22 2205    05/16/22 2205  Protime-INR  Once         05/16/22 2205 05/16/22 2205  Ethanol  Once         05/16/22 2205 05/16/22 " 2205  Urine Drug Screen - Urine, Clean Catch  Once         05/16/22 2205 05/16/22 2205  COVID-19,[facility] IN-HOUSE CEPHEID/MEMO NP SWAB IN TRANSPORT MEDIA 8-12 HR TAT - Swab, Nasopharynx  PROCEDURE ONCE         05/16/22 2206 05/16/22 2205  CBC Auto Differential  PROCEDURE ONCE         05/16/22 2206    --  sertraline (ZOLOFT) 100 MG tablet  Daily         05/17/22 1450    --  OLANZapine (zyPREXA) 10 MG tablet  Nightly         05/17/22 1450    --  LORazepam (ATIVAN) 1 MG tablet  Every 6 Hours         05/17/22 1450    --  SCANNED - TELEMETRY           05/16/22 0000    --  SCANNED - TELEMETRY           05/16/22 0000    --  SCANNED - TELEMETRY           05/16/22 0000                   Physician Progress Notes (all)      Mary Carmen Stearns APRN at 05/18/22 1417     Attestation signed by Cuco Teixeira MD at 05/18/22 1515    Addendum: I have reviewed the history and plan as obtained by SHAYY Galvez and performed my own independent history. I have personally examined the patient and performed greater than 50% of medical decision making. My exam confirms her physical findings and I agree with the plan as listed above, with the addition of the following:     Tells me that he is feeling better today.  Starting to be able to space out his Ativan.  Has required upwards of 30 mg in the past 36 hours.  Only complaint is feeling a little chilled and has some heartburn.  Denies tremors, hallucinations, headache, visual changes.  On exam he still appears unwell from the withdrawal.  Heart is regular and breath sounds are clear.  Abdominal exam is unremarkable and he has no significant peripheral edema.  We will continue on scheduled Librium and CIWA protocol.  Discussed with patient and his nurse at the bedside.  Hopefully his symptoms are starting to keyon and will be able to space out the Ativan further today.  Likely, he is not showing any signs of delirium has not had any behavioral issues.  Continue  gentle IV fluids and other supportive measures.  We will add Maalox, give him a dose of viscous lidocaine, and continue Pepcid.  Continue thiamine, folate, multivitamin.  We will see how he does over the next 24 hours to get a better idea of length of hospital stay required.    Cuco Teixeira MD  [facility]  22  09:51 EDT                        Name: Davonte Mg ADMIT: 2022   : 1990  PCP: Provider, No Known    MRN: [id number] LOS: 1 days   AGE/SEX: 32 y.o. male  ROOM: [facility]     Subjective   Subjective   C/O burning/pain lower abdomen w/mild nausea. No further vomiting. BM yesterday. Still w/mild tremors, diaphoresis. Most recent CIWA 11    Review of Systems   Constitutional: Positive for diaphoresis. Negative for chills and fever.   HENT: Negative for congestion.    Respiratory: Negative for shortness of breath.    Cardiovascular: Negative for chest pain.   Gastrointestinal: Positive for abdominal pain and nausea. Negative for constipation and vomiting.   Genitourinary: Negative for difficulty urinating.   Musculoskeletal: Negative for arthralgias and myalgias.   Skin: Negative for rash.   Neurological: Positive for tremors.   Psychiatric/Behavioral: Positive for sleep disturbance.       Objective   Objective   Vital Signs  Temp:  [98 °F (36.7 °C)-98.7 °F (37.1 °C)] 98 °F (36.7 °C)  Heart Rate:  [58-70] 70  Resp:  [18] 18  BP: (111-137)/() 137/100  SpO2:  [93 %-97 %] 97 %  on  Flow (L/min):  [2] 2;   Device (Oxygen Therapy): room air  Body mass index is 26.29 kg/m².  Physical Exam  Vitals and nursing note reviewed.   Constitutional:       General: He is not in acute distress.     Appearance: He is ill-appearing and diaphoretic. He is not toxic-appearing.   HENT:      Head: Normocephalic.      Mouth/Throat:      Mouth: Mucous membranes are moist.   Eyes:      Conjunctiva/sclera: Conjunctivae normal.   Cardiovascular:      Rate and Rhythm: Normal rate and  regular rhythm.   Pulmonary:      Effort: Pulmonary effort is normal. No respiratory distress.      Breath sounds: Normal breath sounds.   Abdominal:      General: Bowel sounds are normal. There is no distension.      Palpations: Abdomen is soft.   Musculoskeletal:      Cervical back: Neck supple.      Right lower leg: No edema.      Left lower leg: No edema.   Skin:     General: Skin is warm.   Neurological:      Mental Status: He is alert and oriented to person, place, and time.      Motor: Tremor present.   Psychiatric:         Mood and Affect: Mood normal.         Behavior: Behavior normal.         Results Review     I reviewed the patient's new clinical results.  Results from last 7 days   Lab Units 05/18/22  0855 05/17/22  0530 05/16/22  2209   WBC 10*3/mm3 4.18 6.93 4.86   HEMOGLOBIN g/dL 14.2 13.1 14.7   PLATELETS 10*3/mm3 156 167 201     Results from last 7 days   Lab Units 05/18/22  0855 05/17/22  0617 05/16/22  2209   SODIUM mmol/L 136 136 141   POTASSIUM mmol/L 3.5 3.5 3.5   CHLORIDE mmol/L 104 103 106   CO2 mmol/L 23.0 21.9* 22.0   BUN mg/dL 3* 4* 4*   CREATININE mg/dL 0.77 0.62* 0.63*   GLUCOSE mg/dL 126* 92 91   EGFR mL/min/1.73 122.0 130.2 129.6     Results from last 7 days   Lab Units 05/18/22  0855 05/16/22  2209   ALBUMIN g/dL 3.80 4.00   BILIRUBIN mg/dL 0.4 0.2   ALK PHOS U/L 76 78   AST (SGOT) U/L 14 21   ALT (SGPT) U/L 22 29     Results from last 7 days   Lab Units 05/18/22  0855 05/17/22  0617 05/16/22  2209   CALCIUM mg/dL 8.4* 7.8* 7.9*   ALBUMIN g/dL 3.80  --  4.00   MAGNESIUM mg/dL  --  1.7 1.9       No results found for: HGBA1C, POCGLU    XR Chest 1 View    Result Date: 5/17/2022  Subtle increased, hazy opacity in the peripheral aspect of the left lung base, potentially due to a small infiltrate in the proper clinical setting. No further evidence for active disease in the chest.  This report was finalized on 5/17/2022 11:02 AM by Dr. Julio Blood M.D.      Scheduled  Medications  chlordiazePOXIDE, 25 mg, Oral, Q8H  cholecalciferol, 1,000 Units, Oral, Daily  enoxaparin, 40 mg, Subcutaneous, Nightly  famotidine, 20 mg, Intravenous, Q12H  thiamine, 100 mg, Oral, Daily   And  multivitamin, 1 tablet, Oral, Daily   And  folic acid, 1 mg, Oral, Daily  sodium chloride, 10 mL, Intravenous, Q12H    Infusions  sodium chloride, 100 mL/hr, Last Rate: 100 mL/hr (05/17/22 0416)    Diet  Diet Regular      Assessment/Plan     Active Hospital Problems    Diagnosis  POA   • **Alcohol withdrawal syndrome with complication (HCC) [F10.239]  Yes   • Vitamin D deficiency [E55.9]  Yes   • Tobacco dependence due to cigarettes [F17.210]  Yes   • Seizure due to alcohol withdrawal (HCC) [F10.239, R56.9]  Yes   • Hypocalcemia [E83.51]  Yes      Resolved Hospital Problems   No resolved problems to display.       32 y.o. male admitted with Alcohol withdrawal syndrome with complication (HCC).      Mr. Mg is a 32 y.o. male smoker with a history of alcohol abuse, previous seizure who is admitted for alcohol withdrawal     -Monitor on telemetry. Most recent CIWA score 11. Follow CIWA protocol. Access consult. Vitamin repletion. IVF's. Antiemetics as needed. Continue librium. Change pepcid to PPI. Viscous lidocaine x 1 for abd discomfort, TUMS prn  -CXR completed showing potential small infiltrate lt lung base. Afebrile. On room air. WBC wnl. Check procal  -Ca 7.8 with low Vit D, started on daily Vit D supplement  -Previous seizure due to alcohol withdrawal; close monitoring  -Plan to discharge to [facility] once medically stable, likely tomorrow  -Refuses need for nicotine patch. Encourage cessation, IS    · Lovenox 40 mg SC daily for DVT prophylaxis.  · Full code.  · Discussed with patient and nursing staff.  · Anticipate discharge [facility] tomorrow.      SHAYY Moser  [facility]  05/18/22  14:21 EDT        Electronically signed by Puneet  Cuco Gomez MD at 05/18/22 1515       Radha Doe, RN       Case Management   Case Management/Social Work       Signed   Date of Service:  05/18/22 1800   Creation Time:  05/19/22 0740              Signed                Show:Clear all  []Manual[x]Template[]Copied    Added by:  [x]Radha Doe, RN      []Hover for details    Case Management Discharge Note        Final Note: Pt left AMA on 5/18.   STACEY Doe RN            Selected Continued Care - Discharged on 5/18/2022 Admission date: 5/16/2022 - Discharge disposition: Left Against Medical Advice     Destination    No services have been selected for the patient.                 Durable Medical Equipment    No services have been selected for the patient.                 Dialysis/Infusion    No services have been selected for the patient.                 Home Medical Care    No services have been selected for the patient.                 Therapy    No services have been selected for the patient.                 Community Resources    No services have been selected for the patient.                 Community & DME    No services have been selected for the patient.                         Final Discharge Disposition Code: 07 - left AMA                            Discharge Summary    No notes of this type exist for this encounter.         Discharge Order (From admission, onward)    None